# Patient Record
Sex: FEMALE | Race: WHITE | NOT HISPANIC OR LATINO | URBAN - METROPOLITAN AREA
[De-identification: names, ages, dates, MRNs, and addresses within clinical notes are randomized per-mention and may not be internally consistent; named-entity substitution may affect disease eponyms.]

---

## 2018-05-24 DIAGNOSIS — M54.9 DORSALGIA, UNSPECIFIED: ICD-10-CM

## 2018-05-24 PROBLEM — Z00.00 ENCOUNTER FOR PREVENTIVE HEALTH EXAMINATION: Status: ACTIVE | Noted: 2018-05-24

## 2018-07-19 ENCOUNTER — EMERGENCY (EMERGENCY)
Facility: HOSPITAL | Age: 48
LOS: 1 days | Discharge: HOME | End: 2018-07-19
Attending: EMERGENCY MEDICINE | Admitting: EMERGENCY MEDICINE
Payer: COMMERCIAL

## 2018-07-19 VITALS
OXYGEN SATURATION: 100 % | RESPIRATION RATE: 16 BRPM | DIASTOLIC BLOOD PRESSURE: 87 MMHG | TEMPERATURE: 98 F | HEART RATE: 61 BPM | SYSTOLIC BLOOD PRESSURE: 134 MMHG

## 2018-07-19 VITALS
DIASTOLIC BLOOD PRESSURE: 70 MMHG | TEMPERATURE: 99 F | RESPIRATION RATE: 16 BRPM | HEART RATE: 62 BPM | SYSTOLIC BLOOD PRESSURE: 116 MMHG | OXYGEN SATURATION: 100 %

## 2018-07-19 LAB
ALBUMIN SERPL ELPH-MCNC: 4 G/DL — SIGNIFICANT CHANGE UP (ref 3.3–5)
ALP SERPL-CCNC: 76 U/L — SIGNIFICANT CHANGE UP (ref 40–120)
ALT FLD-CCNC: 19 U/L — SIGNIFICANT CHANGE UP (ref 4–33)
AST SERPL-CCNC: 17 U/L — SIGNIFICANT CHANGE UP (ref 4–32)
BASOPHILS # BLD AUTO: 0.07 K/UL — SIGNIFICANT CHANGE UP (ref 0–0.2)
BASOPHILS NFR BLD AUTO: 1 % — SIGNIFICANT CHANGE UP (ref 0–2)
BILIRUB SERPL-MCNC: 0.2 MG/DL — SIGNIFICANT CHANGE UP (ref 0.2–1.2)
BUN SERPL-MCNC: 13 MG/DL — SIGNIFICANT CHANGE UP (ref 7–23)
CALCIUM SERPL-MCNC: 8.8 MG/DL — SIGNIFICANT CHANGE UP (ref 8.4–10.5)
CHLORIDE SERPL-SCNC: 102 MMOL/L — SIGNIFICANT CHANGE UP (ref 98–107)
CO2 SERPL-SCNC: 28 MMOL/L — SIGNIFICANT CHANGE UP (ref 22–31)
CREAT SERPL-MCNC: 0.78 MG/DL — SIGNIFICANT CHANGE UP (ref 0.5–1.3)
EOSINOPHIL # BLD AUTO: 0.25 K/UL — SIGNIFICANT CHANGE UP (ref 0–0.5)
EOSINOPHIL NFR BLD AUTO: 3.5 % — SIGNIFICANT CHANGE UP (ref 0–6)
GLUCOSE SERPL-MCNC: 94 MG/DL — SIGNIFICANT CHANGE UP (ref 70–99)
HCT VFR BLD CALC: 39.8 % — SIGNIFICANT CHANGE UP (ref 34.5–45)
HGB BLD-MCNC: 13.3 G/DL — SIGNIFICANT CHANGE UP (ref 11.5–15.5)
IMM GRANULOCYTES # BLD AUTO: 0.01 # — SIGNIFICANT CHANGE UP
IMM GRANULOCYTES NFR BLD AUTO: 0.1 % — SIGNIFICANT CHANGE UP (ref 0–1.5)
LYMPHOCYTES # BLD AUTO: 2.68 K/UL — SIGNIFICANT CHANGE UP (ref 1–3.3)
LYMPHOCYTES # BLD AUTO: 37.2 % — SIGNIFICANT CHANGE UP (ref 13–44)
MCHC RBC-ENTMCNC: 30.6 PG — SIGNIFICANT CHANGE UP (ref 27–34)
MCHC RBC-ENTMCNC: 33.4 % — SIGNIFICANT CHANGE UP (ref 32–36)
MCV RBC AUTO: 91.7 FL — SIGNIFICANT CHANGE UP (ref 80–100)
MONOCYTES # BLD AUTO: 0.32 K/UL — SIGNIFICANT CHANGE UP (ref 0–0.9)
MONOCYTES NFR BLD AUTO: 4.4 % — SIGNIFICANT CHANGE UP (ref 2–14)
NEUTROPHILS # BLD AUTO: 3.87 K/UL — SIGNIFICANT CHANGE UP (ref 1.8–7.4)
NEUTROPHILS NFR BLD AUTO: 53.8 % — SIGNIFICANT CHANGE UP (ref 43–77)
NRBC # FLD: 0 — SIGNIFICANT CHANGE UP
PLATELET # BLD AUTO: 289 K/UL — SIGNIFICANT CHANGE UP (ref 150–400)
PMV BLD: 10.2 FL — SIGNIFICANT CHANGE UP (ref 7–13)
POTASSIUM SERPL-MCNC: 3.8 MMOL/L — SIGNIFICANT CHANGE UP (ref 3.5–5.3)
POTASSIUM SERPL-SCNC: 3.8 MMOL/L — SIGNIFICANT CHANGE UP (ref 3.5–5.3)
PROT SERPL-MCNC: 6.6 G/DL — SIGNIFICANT CHANGE UP (ref 6–8.3)
RBC # BLD: 4.34 M/UL — SIGNIFICANT CHANGE UP (ref 3.8–5.2)
RBC # FLD: 11.8 % — SIGNIFICANT CHANGE UP (ref 10.3–14.5)
SODIUM SERPL-SCNC: 140 MMOL/L — SIGNIFICANT CHANGE UP (ref 135–145)
WBC # BLD: 7.2 K/UL — SIGNIFICANT CHANGE UP (ref 3.8–10.5)
WBC # FLD AUTO: 7.2 K/UL — SIGNIFICANT CHANGE UP (ref 3.8–10.5)

## 2018-07-19 PROCEDURE — 99285 EMERGENCY DEPT VISIT HI MDM: CPT

## 2018-07-19 PROCEDURE — 76830 TRANSVAGINAL US NON-OB: CPT | Mod: 26

## 2018-07-19 NOTE — ED PROVIDER NOTE - OBJECTIVE STATEMENT
48yo F no sig pmhx pw vaginal bleeding. States started to bleed Saturday,  5 days ago, Intermittent heavy since. This morning went to work had a "gush of blood w clots"  lower abdominal cramp. LMP was three months ago, "I am going through the change (menopause)" not dizzy or lightheaded. no chest pain or sob. no nausea/vomit./fever or chills. not on AC. hx of psoriasis  hx of tubal ligation

## 2018-07-19 NOTE — CONSULT NOTE ADULT - ASSESSMENT
47y , LMP Apr/May 2018, presents with heavy vaginal bleeding. H/H stable. VSS. No active bleeding. Thickened endometrium likely 2/2 amenorrhea x 2 months, experiencing withdrawal bleeding    -No acute GYN interventions at this time  -F/u with Dr. Sal to establish GYN care  -Pt to be dicharged home with bleeding and anemia precautions    D/w Dr. Doron Villatoro PGY2 47y , LMP Apr/May 2018, presents with heavy vaginal bleeding. H/H stable. VSS. No active bleeding. Thickened endometrium likely 2/2 amenorrhea x 2 months, experiencing withdrawal bleeding    #Abnormal uterine bleeding  -No acute GYN interventions at this time  -F/u with Dr. Sal to establish GYN care  -Pt to be dicharged home with bleeding and anemia precautions    D/w Dr. Doron Villatoro PGY2

## 2018-07-19 NOTE — CONSULT NOTE ADULT - SUBJECTIVE AND OBJECTIVE BOX
R2 GYNECOLOGIC CONSULT NOTE    47y , LMP Apr/May 2018, presents with heavy vaginal bleeding. Pt states that she has not had a period for a couple months. She typically gets her periods monthly, that are moderate and last for 3-4 days. She states she first started having vaginal spotting this past month that resolved spontaneously. On  she started her "period" that was much heavier than normal. She felt the blood "pour" out with passage of some clots. Bleeding became moderate to light after a few days. This AM she felt another gush of blood and saturated 2 pads in 4 hours. Denies lightheadedness, dizziness, SOB, CP, fever, chills.    OB/GYN HISTORY:  x 2 uncomplicated (, )    Surgical History:  tubal ligation     Past Medical History: denies    Medications: OTC pain medications     Allergies: No Known Allergies    Social History: occasional etoh, denies tobacco, illicit drug use     REVIEW OF SYSTEMS: WNL except as stated in HPI    OBJECTIVE FINDINGS:    Vital Signs Last 24 Hrs  T(C): 36.7 (2018 10:54), Max: 36.7 (2018 10:54)  T(F): 98 (2018 10:54), Max: 98 (2018 10:54)  HR: 61 (2018 10:54) (61 - 61)  BP: 134/87 (2018 10:54) (134/87 - 134/87)  BP(mean): --  RR: 16 (2018 10:54) (16 - 16)  SpO2: 100% (2018 10:54) (100% - 100%)    PHYSICAL EXAM:    GENERAL: NAD, well-developed  ABDOMEN: Soft, Nontender, Nondistended; No rebound, No guarding  PELVIC: SSE revealed 10 cc of dark red blood and no clots. Os closed, uterus anteverted approx 10 weeks in size. Adnexa nonpalpable and nontender b/l.      LABS:                        13.3   7.20  )-----------( 289      ( 2018 12:07 )             39.8     -    140  |  102  |  13  ----------------------------<  94  3.8   |  28  |  0.78    Ca    8.8      2018 12:07    TPro  6.6  /  Alb  4.0  /  TBili  0.2  /  DBili  x   /  AST  17  /  ALT  19  /  AlkPhos  76  -          RADIOLOGY & ADDITIONAL STUDIES:  < from: US Transvaginal (18 @ 13:09) >  PROCEDURE DATE:  2018         INTERPRETATION:  CLINICAL INFORMATION: Patient with irregular menstrual   cycles. New onset of heavy vaginal bleeding with clots.    LMP: Unknown.    COMPARISON: None available.    TECHNIQUE:     Transabdominal and Endovaginal pelvic sonogram.        FINDINGS:    Uterus: 9.4 x 5.2 x 4.7 cm. An intramural fibroid of 1.5 cm in the fundus.    Endometrium: 13 mm. Within normal limits.    Right ovary: 3.6 x 3.8 x 2.9 cm. Within normal limits. Contains a   physiological cyst.    Left ovary: Not visualized.    Fluid: None.    IMPRESSION:    Small intramural fibroid. Uterus is otherwise normal in appearance.  Left ovary is not visualized.    < end of copied text >

## 2018-07-19 NOTE — ED PROVIDER NOTE - PROGRESS NOTE DETAILS
Jose E Sal, knows pt request labs and us, to be called after results. GYN evaluated pt, may dc home w out patient GYN followup. dc w copies of results.

## 2018-07-19 NOTE — ED ADULT TRIAGE NOTE - CHIEF COMPLAINT QUOTE
pt c/o heavy vaginal bleeding on Saturday evening and then started again this morning. pt also c/o lower abd and lower back cramping pmhx tubal ligation (2005)

## 2018-07-19 NOTE — ED PROVIDER NOTE - PHYSICAL EXAMINATION
pt dressed sitting up no distress. nontoxic, not clinically orthostatic.  normal S1-S2 No resp distress. able to speak in full and clear sentences. no wheeze, rales or stridor. soft nondistended mild lower mid tenderness. pt AOx3 refused pelvic exam

## 2018-07-19 NOTE — ED ADULT NURSE NOTE - OBJECTIVE STATEMENT
pt states last normal period 3 months ago, last night she began to have very heavy vaginal bleeding, with painful cramping. 20g placed to right AC labs drawn and sent .

## 2018-09-11 ENCOUNTER — APPOINTMENT (OUTPATIENT)
Dept: PEDIATRICS | Facility: CLINIC | Age: 48
End: 2018-09-11
Payer: COMMERCIAL

## 2018-09-11 DIAGNOSIS — Z11.1 ENCOUNTER FOR SCREENING FOR RESPIRATORY TUBERCULOSIS: ICD-10-CM

## 2018-09-11 PROCEDURE — 86580 TB INTRADERMAL TEST: CPT

## 2018-09-12 ENCOUNTER — RESULT REVIEW (OUTPATIENT)
Age: 48
End: 2018-09-12

## 2018-09-22 ENCOUNTER — RX RENEWAL (OUTPATIENT)
Age: 48
End: 2018-09-22

## 2018-11-08 ENCOUNTER — APPOINTMENT (OUTPATIENT)
Dept: PEDIATRICS | Facility: CLINIC | Age: 48
End: 2018-11-08
Payer: COMMERCIAL

## 2018-11-08 DIAGNOSIS — Z23 ENCOUNTER FOR IMMUNIZATION: ICD-10-CM

## 2018-11-08 PROCEDURE — 90471 IMMUNIZATION ADMIN: CPT

## 2018-11-08 PROCEDURE — 90686 IIV4 VACC NO PRSV 0.5 ML IM: CPT

## 2018-11-08 NOTE — HISTORY OF PRESENT ILLNESS
[de-identified] : flu vaccine [FreeTextEntry6] : JOSH  here for vaccine update, no complaints\par

## 2018-11-23 ENCOUNTER — RX RENEWAL (OUTPATIENT)
Age: 48
End: 2018-11-23

## 2019-01-14 NOTE — ED PROVIDER NOTE - TOBACCO USE
Ventricular Rate : 63   Atrial Rate : 63   P-R Interval : 204   QRS Duration : 95   Q-T Interval : 415   QTC Calculation(Bezet) : 425   P Axis : 41   R Axis : 2   T Axis : 33   Diagnosis : Sinus rhythm~Borderline prolonged NH interval~Probable left atrial enlargement~~Confirmed by Alok Schwarz ([id number]) on 12/1/2018 8:07:39 PM     
Unknown if ever smoked

## 2019-02-04 ENCOUNTER — TRANSCRIPTION ENCOUNTER (OUTPATIENT)
Age: 49
End: 2019-02-04

## 2019-02-04 DIAGNOSIS — J06.9 ACUTE UPPER RESPIRATORY INFECTION, UNSPECIFIED: ICD-10-CM

## 2019-04-08 NOTE — ED ADULT NURSE NOTE - CHIEF COMPLAINT QUOTE
He is in a panic mode - make an appt this week.  He is having new bizarre sx.   pt c/o heavy vaginal bleeding on Saturday evening and then started again this morning. pt also c/o lower abd and lower back cramping pmhx tubal ligation (2005)

## 2019-08-15 DIAGNOSIS — K21.9 GASTRO-ESOPHAGEAL REFLUX DISEASE W/OUT ESOPHAGITIS: ICD-10-CM

## 2019-11-04 ENCOUNTER — LABORATORY RESULT (OUTPATIENT)
Age: 49
End: 2019-11-04

## 2019-11-04 ENCOUNTER — APPOINTMENT (OUTPATIENT)
Dept: DERMATOLOGY | Facility: CLINIC | Age: 49
End: 2019-11-04
Payer: COMMERCIAL

## 2019-11-04 VITALS — WEIGHT: 210 LBS | BODY MASS INDEX: 34.99 KG/M2 | HEIGHT: 65 IN

## 2019-11-04 DIAGNOSIS — Q82.8 OTHER SPECIFIED CONGENITAL MALFORMATIONS OF SKIN: ICD-10-CM

## 2019-11-04 DIAGNOSIS — R22.9 LOCALIZED SWELLING, MASS AND LUMP, UNSPECIFIED: ICD-10-CM

## 2019-11-04 PROCEDURE — 11104 PUNCH BX SKIN SINGLE LESION: CPT

## 2019-11-04 PROCEDURE — 99203 OFFICE O/P NEW LOW 30 MIN: CPT | Mod: 25

## 2019-11-09 ENCOUNTER — TRANSCRIPTION ENCOUNTER (OUTPATIENT)
Age: 49
End: 2019-11-09

## 2019-11-18 ENCOUNTER — APPOINTMENT (OUTPATIENT)
Dept: DERMATOLOGY | Facility: CLINIC | Age: 49
End: 2019-11-18

## 2019-12-01 ENCOUNTER — LABORATORY RESULT (OUTPATIENT)
Age: 49
End: 2019-12-01

## 2019-12-02 ENCOUNTER — APPOINTMENT (OUTPATIENT)
Dept: DERMATOLOGY | Facility: CLINIC | Age: 49
End: 2019-12-02
Payer: COMMERCIAL

## 2019-12-02 DIAGNOSIS — D48.5 NEOPLASM OF UNCERTAIN BEHAVIOR OF SKIN: ICD-10-CM

## 2019-12-02 DIAGNOSIS — D86.9 SARCOIDOSIS, UNSPECIFIED: ICD-10-CM

## 2019-12-02 PROCEDURE — 11104 PUNCH BX SKIN SINGLE LESION: CPT

## 2019-12-02 PROCEDURE — 99213 OFFICE O/P EST LOW 20 MIN: CPT | Mod: 25

## 2020-02-03 ENCOUNTER — APPOINTMENT (OUTPATIENT)
Dept: MAMMOGRAPHY | Facility: IMAGING CENTER | Age: 50
End: 2020-02-03
Payer: COMMERCIAL

## 2020-02-03 ENCOUNTER — OUTPATIENT (OUTPATIENT)
Dept: OUTPATIENT SERVICES | Facility: HOSPITAL | Age: 50
LOS: 1 days | End: 2020-02-03
Payer: COMMERCIAL

## 2020-02-03 DIAGNOSIS — Z00.00 ENCOUNTER FOR GENERAL ADULT MEDICAL EXAMINATION WITHOUT ABNORMAL FINDINGS: ICD-10-CM

## 2020-02-03 PROCEDURE — 77063 BREAST TOMOSYNTHESIS BI: CPT

## 2020-02-03 PROCEDURE — 77063 BREAST TOMOSYNTHESIS BI: CPT | Mod: 26

## 2020-02-03 PROCEDURE — 77067 SCR MAMMO BI INCL CAD: CPT

## 2020-02-03 PROCEDURE — 77067 SCR MAMMO BI INCL CAD: CPT | Mod: 26

## 2020-02-25 ENCOUNTER — TRANSCRIPTION ENCOUNTER (OUTPATIENT)
Age: 50
End: 2020-02-25

## 2020-03-09 ENCOUNTER — APPOINTMENT (OUTPATIENT)
Dept: ULTRASOUND IMAGING | Facility: IMAGING CENTER | Age: 50
End: 2020-03-09

## 2020-04-26 ENCOUNTER — MESSAGE (OUTPATIENT)
Age: 50
End: 2020-04-26

## 2020-05-15 DIAGNOSIS — L40.9 PSORIASIS, UNSPECIFIED: ICD-10-CM

## 2020-05-19 ENCOUNTER — APPOINTMENT (OUTPATIENT)
Dept: DISASTER EMERGENCY | Facility: CLINIC | Age: 50
End: 2020-05-19

## 2020-05-20 LAB
SARS-COV-2 IGG SERPL IA-ACNC: <0.1 INDEX
SARS-COV-2 IGG SERPL QL IA: NEGATIVE

## 2020-12-16 PROBLEM — Z11.1 ENCOUNTER FOR PPD TEST: Status: RESOLVED | Noted: 2018-09-11 | Resolved: 2020-12-16

## 2020-12-21 PROBLEM — J06.9 VIRAL UPPER RESPIRATORY TRACT INFECTION: Status: RESOLVED | Noted: 2019-02-06 | Resolved: 2020-12-21

## 2021-06-17 ENCOUNTER — RESULT REVIEW (OUTPATIENT)
Age: 51
End: 2021-06-17

## 2021-09-21 ENCOUNTER — TRANSCRIPTION ENCOUNTER (OUTPATIENT)
Age: 51
End: 2021-09-21

## 2021-09-23 ENCOUNTER — RESULT REVIEW (OUTPATIENT)
Age: 51
End: 2021-09-23

## 2021-09-23 ENCOUNTER — OUTPATIENT (OUTPATIENT)
Dept: OUTPATIENT SERVICES | Facility: HOSPITAL | Age: 51
LOS: 1 days | End: 2021-09-23
Payer: COMMERCIAL

## 2021-09-23 VITALS
RESPIRATION RATE: 18 BRPM | OXYGEN SATURATION: 98 % | DIASTOLIC BLOOD PRESSURE: 51 MMHG | HEIGHT: 64.5 IN | HEART RATE: 77 BPM | WEIGHT: 212.53 LBS | SYSTOLIC BLOOD PRESSURE: 120 MMHG | TEMPERATURE: 97 F

## 2021-09-23 DIAGNOSIS — C50.912 MALIGNANT NEOPLASM OF UNSPECIFIED SITE OF LEFT FEMALE BREAST: ICD-10-CM

## 2021-09-23 DIAGNOSIS — Z98.51 TUBAL LIGATION STATUS: Chronic | ICD-10-CM

## 2021-09-23 DIAGNOSIS — Z98.890 OTHER SPECIFIED POSTPROCEDURAL STATES: Chronic | ICD-10-CM

## 2021-09-23 DIAGNOSIS — Z01.818 ENCOUNTER FOR OTHER PREPROCEDURAL EXAMINATION: ICD-10-CM

## 2021-09-23 DIAGNOSIS — C50.812 MALIGNANT NEOPLASM OF OVERLAPPING SITES OF LEFT FEMALE BREAST: ICD-10-CM

## 2021-09-23 LAB
ANION GAP SERPL CALC-SCNC: 10 MMOL/L — SIGNIFICANT CHANGE UP (ref 5–17)
BUN SERPL-MCNC: 12 MG/DL — SIGNIFICANT CHANGE UP (ref 7–23)
CALCIUM SERPL-MCNC: 9.2 MG/DL — SIGNIFICANT CHANGE UP (ref 8.4–10.5)
CHLORIDE SERPL-SCNC: 104 MMOL/L — SIGNIFICANT CHANGE UP (ref 96–108)
CO2 SERPL-SCNC: 28 MMOL/L — SIGNIFICANT CHANGE UP (ref 22–31)
CREAT SERPL-MCNC: 0.74 MG/DL — SIGNIFICANT CHANGE UP (ref 0.5–1.3)
GLUCOSE SERPL-MCNC: 101 MG/DL — HIGH (ref 70–99)
HCT VFR BLD CALC: 41.7 % — SIGNIFICANT CHANGE UP (ref 34.5–45)
HGB BLD-MCNC: 14.1 G/DL — SIGNIFICANT CHANGE UP (ref 11.5–15.5)
MCHC RBC-ENTMCNC: 32.1 PG — SIGNIFICANT CHANGE UP (ref 27–34)
MCHC RBC-ENTMCNC: 33.8 GM/DL — SIGNIFICANT CHANGE UP (ref 32–36)
MCV RBC AUTO: 95 FL — SIGNIFICANT CHANGE UP (ref 80–100)
NRBC # BLD: 0 /100 WBCS — SIGNIFICANT CHANGE UP (ref 0–0)
PLATELET # BLD AUTO: 283 K/UL — SIGNIFICANT CHANGE UP (ref 150–400)
POTASSIUM SERPL-MCNC: 4.1 MMOL/L — SIGNIFICANT CHANGE UP (ref 3.5–5.3)
POTASSIUM SERPL-SCNC: 4.1 MMOL/L — SIGNIFICANT CHANGE UP (ref 3.5–5.3)
RBC # BLD: 4.39 M/UL — SIGNIFICANT CHANGE UP (ref 3.8–5.2)
RBC # FLD: 12.2 % — SIGNIFICANT CHANGE UP (ref 10.3–14.5)
SARS-COV-2 RNA SPEC QL NAA+PROBE: SIGNIFICANT CHANGE UP
SODIUM SERPL-SCNC: 142 MMOL/L — SIGNIFICANT CHANGE UP (ref 135–145)
WBC # BLD: 6.84 K/UL — SIGNIFICANT CHANGE UP (ref 3.8–10.5)
WBC # FLD AUTO: 6.84 K/UL — SIGNIFICANT CHANGE UP (ref 3.8–10.5)

## 2021-09-23 PROCEDURE — G0463: CPT

## 2021-09-23 PROCEDURE — 85027 COMPLETE CBC AUTOMATED: CPT

## 2021-09-23 PROCEDURE — 88321 CONSLTJ&REPRT SLD PREP ELSWR: CPT

## 2021-09-23 PROCEDURE — U0003: CPT

## 2021-09-23 PROCEDURE — 36415 COLL VENOUS BLD VENIPUNCTURE: CPT

## 2021-09-23 PROCEDURE — U0005: CPT

## 2021-09-23 PROCEDURE — 80048 BASIC METABOLIC PNL TOTAL CA: CPT

## 2021-09-23 NOTE — H&P PST ADULT - HISTORY OF PRESENT ILLNESS
49 y/o female presents for PST. As per patient she had routine mammo and ultrasound done in August 2021 and abnormality was noted to left breast. Patient was referred for biopsy which resulted stage 1 ductal carcinoma per patient. Patient hx of covid or recent travelling. Patient stated that she is fully vaccinated.

## 2021-09-23 NOTE — H&P PST ADULT - PROBLEM SELECTOR PLAN 1
Patient schedule for left breast lumpectomy, lymph biopsy and b/l breast reduction. Labs and covid testing pending.

## 2021-09-23 NOTE — H&P PST ADULT - NSICDXPASTMEDICALHX_GEN_ALL_CORE_FT
PAST MEDICAL HISTORY:  Anxiety     Ductal carcinoma of left breast     H/O vertigo     Joint pain     Plaque psoriasis

## 2021-09-23 NOTE — H&P PST ADULT - ATTENDING COMMENTS
The patient is a 50 year old female who was recently diagnosed with left 3:00 invasive ductal breast cancer. She presents to undergo a left tamiko  localization lumpectomy, left axillary sentinel lymph node biopsy, possible left axillary lymph node dissection and bilateral oncoplastic reduction.

## 2021-09-23 NOTE — H&P PST ADULT - NSICDXFAMILYHX_GEN_ALL_CORE_FT
FAMILY HISTORY:  Mother  Still living? No  FH: heart attack, Age at diagnosis: Age Unknown  FH: heart disease, Age at diagnosis: Age Unknown  FH: HTN (hypertension), Age at diagnosis: Age Unknown  FH: hypercholesterolemia, Age at diagnosis: Age Unknown  FH: type 2 diabetes, Age at diagnosis: Age Unknown    Sibling  Still living? Unknown  FH: asthma, Age at diagnosis: Age Unknown

## 2021-09-25 ENCOUNTER — APPOINTMENT (OUTPATIENT)
Dept: ULTRASOUND IMAGING | Facility: CLINIC | Age: 51
End: 2021-09-25
Payer: COMMERCIAL

## 2021-09-25 ENCOUNTER — OUTPATIENT (OUTPATIENT)
Dept: OUTPATIENT SERVICES | Facility: HOSPITAL | Age: 51
LOS: 1 days | End: 2021-09-25
Payer: COMMERCIAL

## 2021-09-25 DIAGNOSIS — Z00.8 ENCOUNTER FOR OTHER GENERAL EXAMINATION: ICD-10-CM

## 2021-09-25 DIAGNOSIS — Z98.51 TUBAL LIGATION STATUS: Chronic | ICD-10-CM

## 2021-09-25 DIAGNOSIS — Z98.890 OTHER SPECIFIED POSTPROCEDURAL STATES: Chronic | ICD-10-CM

## 2021-09-25 PROBLEM — L40.0 PSORIASIS VULGARIS: Chronic | Status: ACTIVE | Noted: 2021-09-23

## 2021-09-25 PROBLEM — F41.9 ANXIETY DISORDER, UNSPECIFIED: Chronic | Status: ACTIVE | Noted: 2021-09-23

## 2021-09-25 PROBLEM — Z87.898 PERSONAL HISTORY OF OTHER SPECIFIED CONDITIONS: Chronic | Status: ACTIVE | Noted: 2021-09-23

## 2021-09-25 PROCEDURE — C1739: CPT

## 2021-09-25 PROCEDURE — 19285 PERQ DEV BREAST 1ST US IMAG: CPT | Mod: LT

## 2021-09-25 PROCEDURE — 19285 PERQ DEV BREAST 1ST US IMAG: CPT

## 2021-09-26 ENCOUNTER — TRANSCRIPTION ENCOUNTER (OUTPATIENT)
Age: 51
End: 2021-09-26

## 2021-09-27 ENCOUNTER — RESULT REVIEW (OUTPATIENT)
Age: 51
End: 2021-09-27

## 2021-09-27 ENCOUNTER — OUTPATIENT (OUTPATIENT)
Dept: OUTPATIENT SERVICES | Facility: HOSPITAL | Age: 51
LOS: 1 days | End: 2021-09-27
Payer: COMMERCIAL

## 2021-09-27 VITALS
RESPIRATION RATE: 16 BRPM | HEART RATE: 89 BPM | TEMPERATURE: 98 F | SYSTOLIC BLOOD PRESSURE: 117 MMHG | OXYGEN SATURATION: 95 % | DIASTOLIC BLOOD PRESSURE: 76 MMHG

## 2021-09-27 VITALS
HEIGHT: 64.5 IN | WEIGHT: 212.53 LBS | DIASTOLIC BLOOD PRESSURE: 77 MMHG | RESPIRATION RATE: 16 BRPM | OXYGEN SATURATION: 95 % | SYSTOLIC BLOOD PRESSURE: 115 MMHG | TEMPERATURE: 98 F | HEART RATE: 78 BPM

## 2021-09-27 DIAGNOSIS — Z98.890 OTHER SPECIFIED POSTPROCEDURAL STATES: Chronic | ICD-10-CM

## 2021-09-27 DIAGNOSIS — C50.812 MALIGNANT NEOPLASM OF OVERLAPPING SITES OF LEFT FEMALE BREAST: ICD-10-CM

## 2021-09-27 DIAGNOSIS — Z98.51 TUBAL LIGATION STATUS: Chronic | ICD-10-CM

## 2021-09-27 PROCEDURE — 76098 X-RAY EXAM SURGICAL SPECIMEN: CPT | Mod: 26

## 2021-09-27 PROCEDURE — 88307 TISSUE EXAM BY PATHOLOGIST: CPT | Mod: 26

## 2021-09-27 PROCEDURE — 38525 BIOPSY/REMOVAL LYMPH NODES: CPT

## 2021-09-27 PROCEDURE — 88305 TISSUE EXAM BY PATHOLOGIST: CPT | Mod: 26

## 2021-09-27 PROCEDURE — 88307 TISSUE EXAM BY PATHOLOGIST: CPT

## 2021-09-27 PROCEDURE — 88305 TISSUE EXAM BY PATHOLOGIST: CPT

## 2021-09-27 PROCEDURE — 38792 RA TRACER ID OF SENTINL NODE: CPT

## 2021-09-27 PROCEDURE — 19301 PARTIAL MASTECTOMY: CPT | Mod: LT

## 2021-09-27 PROCEDURE — A9541: CPT

## 2021-09-27 PROCEDURE — 19318 BREAST REDUCTION: CPT | Mod: XP

## 2021-09-27 PROCEDURE — 76098 X-RAY EXAM SURGICAL SPECIMEN: CPT

## 2021-09-27 RX ORDER — OXYCODONE AND ACETAMINOPHEN 5; 325 MG/1; MG/1
1 TABLET ORAL EVERY 4 HOURS
Refills: 0 | Status: DISCONTINUED | OUTPATIENT
Start: 2021-09-27 | End: 2021-09-27

## 2021-09-27 RX ORDER — HYDROMORPHONE HYDROCHLORIDE 2 MG/ML
0.5 INJECTION INTRAMUSCULAR; INTRAVENOUS; SUBCUTANEOUS
Refills: 0 | Status: COMPLETED | OUTPATIENT
Start: 2021-09-27 | End: 2021-09-27

## 2021-09-27 RX ORDER — SODIUM CHLORIDE 9 MG/ML
1000 INJECTION, SOLUTION INTRAVENOUS
Refills: 0 | Status: DISCONTINUED | OUTPATIENT
Start: 2021-09-27 | End: 2021-09-27

## 2021-09-27 RX ORDER — HYDROMORPHONE HYDROCHLORIDE 2 MG/ML
1 INJECTION INTRAMUSCULAR; INTRAVENOUS; SUBCUTANEOUS ONCE
Refills: 0 | Status: DISCONTINUED | OUTPATIENT
Start: 2021-09-27 | End: 2021-09-27

## 2021-09-27 RX ORDER — ONDANSETRON 8 MG/1
4 TABLET, FILM COATED ORAL ONCE
Refills: 0 | Status: DISCONTINUED | OUTPATIENT
Start: 2021-09-27 | End: 2021-09-27

## 2021-09-27 RX ORDER — HYDROMORPHONE HYDROCHLORIDE 2 MG/ML
0.5 INJECTION INTRAMUSCULAR; INTRAVENOUS; SUBCUTANEOUS ONCE
Refills: 0 | Status: DISCONTINUED | OUTPATIENT
Start: 2021-09-27 | End: 2021-09-27

## 2021-09-27 RX ADMIN — SODIUM CHLORIDE 50 MILLILITER(S): 9 INJECTION, SOLUTION INTRAVENOUS at 06:33

## 2021-09-27 RX ADMIN — HYDROMORPHONE HYDROCHLORIDE 0.5 MILLIGRAM(S): 2 INJECTION INTRAMUSCULAR; INTRAVENOUS; SUBCUTANEOUS at 12:20

## 2021-09-27 RX ADMIN — HYDROMORPHONE HYDROCHLORIDE 0.5 MILLIGRAM(S): 2 INJECTION INTRAMUSCULAR; INTRAVENOUS; SUBCUTANEOUS at 12:40

## 2021-09-27 RX ADMIN — HYDROMORPHONE HYDROCHLORIDE 0.5 MILLIGRAM(S): 2 INJECTION INTRAMUSCULAR; INTRAVENOUS; SUBCUTANEOUS at 12:05

## 2021-09-27 RX ADMIN — OXYCODONE AND ACETAMINOPHEN 1 TABLET(S): 5; 325 TABLET ORAL at 13:39

## 2021-09-27 RX ADMIN — OXYCODONE AND ACETAMINOPHEN 1 TABLET(S): 5; 325 TABLET ORAL at 14:10

## 2021-09-27 NOTE — BRIEF OPERATIVE NOTE - NSICDXBRIEFPROCEDURE_GEN_ALL_CORE_FT
PROCEDURES:  Oncoplastic breast reduction 27-Sep-2021 11:55:24 Bilateral Damion Alcala  
PROCEDURES:  Excision of accessory axillary breast tissue 27-Sep-2021 09:41:34 left Palleschi, Susan M  Lumpectomy of breast with sentinel node biopsy 27-Sep-2021 09:41:52 left 3:00 with tamiko  localization Palleschi, Susan M

## 2021-09-27 NOTE — ASU DISCHARGE PLAN (ADULT/PEDIATRIC) - ASU DC SPECIAL INSTRUCTIONSFT
This patient may be discharged home from ASU when criteria is met   Please provide BENITEZ drain instructions , and Queenie instructions  have patient record output  take all prescriptions as directed by Dr Zavala   Do not shower with wound vac in place , keep all dry, may sponge bathe  no heavy lifting, pushing, or pulling   advance diet slowly   Drink plenty of water

## 2021-09-27 NOTE — BRIEF OPERATIVE NOTE - NSICDXBRIEFPOSTOP_GEN_ALL_CORE_FT
POST-OP DIAGNOSIS:  Cancer of overlapping sites of left breast 27-Sep-2021 09:42:37  Palleschi, Susan M  Axillary accessory breast tissue 27-Sep-2021 09:43:15 left Palleschi, Susan M  
POST-OP DIAGNOSIS:  Cancer of overlapping sites of left breast 27-Sep-2021 09:42:37  Palleschi, Susan M  Axillary accessory breast tissue 27-Sep-2021 09:43:15 left Palleschi, Susan M

## 2021-09-27 NOTE — ASU DISCHARGE PLAN (ADULT/PEDIATRIC) - CARE PROVIDER_API CALL
Zeke Zavala)  Plastic Surgery; Surgery  [address], [address]  [place] [address]  Phone: [phone]  Fax: [phone]  Follow Up Time:     Palleschi, Susan M (MD)  Surgery  [address]  [place] [address]  Phone: [phone]  Fax: [phone]  Follow Up Time:

## 2021-09-27 NOTE — PRE-ANESTHESIA EVALUATION ADULT - NSANTHINDVINFOSD_GEN_ALL_CORE
Normal and symmetric appearance without webbing, redundant skin, masses, pits or sternocleidomastoid muscle lesions; clavicles of normal shape, contour and nontender on palpation.
patient

## 2021-09-27 NOTE — BRIEF OPERATIVE NOTE - NSICDXBRIEFPREOP_GEN_ALL_CORE_FT
PRE-OP DIAGNOSIS:  Cancer of overlapping sites of left breast 27-Sep-2021 09:42:24  Palleschi, Susan M  Axillary accessory breast tissue 27-Sep-2021 09:42:50 left Palleschi, Susan M  
PRE-OP DIAGNOSIS:  Cancer of overlapping sites of left breast 27-Sep-2021 09:42:24  Palleschi, Susan M  Axillary accessory breast tissue 27-Sep-2021 09:42:50 left Palleschi, Susan M

## 2021-09-27 NOTE — BRIEF OPERATIVE NOTE - SPECIMENS
left axillary sentinel lymph node x 1, left 3:00 lumpectomy, left axillary accessory breast tissue
bilateral breast skin and tissue

## 2021-09-27 NOTE — ASU DISCHARGE PLAN (ADULT/PEDIATRIC) - NURSING INSTRUCTIONS
drink plenty fluids,  empty and record drains as per instructions,  follow up with Dr. Zavala in one week- call office for an appointment time and bring drain record,  call Dr. Palleschi  office for an appointment time in 2 weeks

## 2021-09-27 NOTE — PRE-ANESTHESIA EVALUATION ADULT - NSANTHADDINFOFT_GEN_ALL_CORE
Discussed GA with ETT in detail with patient and all questions sought and answered. Pt. agrees to all plans and wishes to proceed with surgical care.

## 2021-11-08 ENCOUNTER — TRANSCRIPTION ENCOUNTER (OUTPATIENT)
Age: 51
End: 2021-11-08

## 2021-11-08 ENCOUNTER — APPOINTMENT (OUTPATIENT)
Dept: CT IMAGING | Facility: CLINIC | Age: 51
End: 2021-11-08
Payer: COMMERCIAL

## 2021-11-08 ENCOUNTER — OUTPATIENT (OUTPATIENT)
Dept: OUTPATIENT SERVICES | Facility: HOSPITAL | Age: 51
LOS: 1 days | End: 2021-11-08
Payer: COMMERCIAL

## 2021-11-08 DIAGNOSIS — Z98.51 TUBAL LIGATION STATUS: Chronic | ICD-10-CM

## 2021-11-08 DIAGNOSIS — Z98.890 OTHER SPECIFIED POSTPROCEDURAL STATES: Chronic | ICD-10-CM

## 2021-11-08 DIAGNOSIS — Z00.8 ENCOUNTER FOR OTHER GENERAL EXAMINATION: ICD-10-CM

## 2021-11-08 PROCEDURE — 74174 CTA ABD&PLVS W/CONTRAST: CPT | Mod: 26

## 2021-11-08 PROCEDURE — 74174 CTA ABD&PLVS W/CONTRAST: CPT

## 2021-11-12 ENCOUNTER — RESULT REVIEW (OUTPATIENT)
Age: 51
End: 2021-11-12

## 2021-11-29 ENCOUNTER — RESULT REVIEW (OUTPATIENT)
Age: 51
End: 2021-11-29

## 2021-12-06 ENCOUNTER — OUTPATIENT (OUTPATIENT)
Dept: OUTPATIENT SERVICES | Facility: HOSPITAL | Age: 51
LOS: 1 days | End: 2021-12-06
Payer: COMMERCIAL

## 2021-12-06 VITALS
TEMPERATURE: 99 F | HEART RATE: 82 BPM | RESPIRATION RATE: 17 BRPM | HEIGHT: 64 IN | WEIGHT: 212.08 LBS | DIASTOLIC BLOOD PRESSURE: 79 MMHG | SYSTOLIC BLOOD PRESSURE: 110 MMHG | OXYGEN SATURATION: 95 %

## 2021-12-06 DIAGNOSIS — Z98.890 OTHER SPECIFIED POSTPROCEDURAL STATES: Chronic | ICD-10-CM

## 2021-12-06 DIAGNOSIS — Z98.51 TUBAL LIGATION STATUS: Chronic | ICD-10-CM

## 2021-12-06 DIAGNOSIS — C50.812 MALIGNANT NEOPLASM OF OVERLAPPING SITES OF LEFT FEMALE BREAST: ICD-10-CM

## 2021-12-06 DIAGNOSIS — Z01.818 ENCOUNTER FOR OTHER PREPROCEDURAL EXAMINATION: ICD-10-CM

## 2021-12-06 DIAGNOSIS — C50.912 MALIGNANT NEOPLASM OF UNSPECIFIED SITE OF LEFT FEMALE BREAST: ICD-10-CM

## 2021-12-06 PROCEDURE — 86901 BLOOD TYPING SEROLOGIC RH(D): CPT

## 2021-12-06 PROCEDURE — G0463: CPT

## 2021-12-06 PROCEDURE — 85027 COMPLETE CBC AUTOMATED: CPT

## 2021-12-06 PROCEDURE — 86850 RBC ANTIBODY SCREEN: CPT

## 2021-12-06 PROCEDURE — 86900 BLOOD TYPING SEROLOGIC ABO: CPT

## 2021-12-06 PROCEDURE — 80048 BASIC METABOLIC PNL TOTAL CA: CPT

## 2021-12-06 RX ORDER — PREGABALIN 225 MG/1
1 CAPSULE ORAL
Qty: 0 | Refills: 0 | DISCHARGE

## 2021-12-06 RX ORDER — CEFAZOLIN SODIUM 1 G
2000 VIAL (EA) INJECTION ONCE
Refills: 0 | Status: COMPLETED | OUTPATIENT
Start: 2021-12-20 | End: 2021-12-20

## 2021-12-06 NOTE — H&P PST ADULT - FALL HARM RISK - UNIVERSAL INTERVENTIONS
Bed in lowest position, wheels locked, appropriate side rails in place/Call bell, personal items and telephone in reach/Instruct patient to call for assistance before getting out of bed or chair/Non-slip footwear when patient is out of bed/[place] to call system/Physically safe environment - no spills, clutter or unnecessary equipment/Purposeful Proactive Rounding/Room/bathroom lighting operational, light cord in reach

## 2021-12-06 NOTE — H&P PST ADULT - HISTORY OF PRESENT ILLNESS
51 yr old female newly dx with bilateral breast cancer s/p lumpectomy and reduction. Now for bilateral mastectomies with ALESSANDRO flap.    **COVID  denies travel  denies s/s  denies known exposure  vaccine Pfizer x 2 last 4/28/21  swab  12/17 [facility]

## 2021-12-06 NOTE — H&P PST ADULT - ATTENDING COMMENTS
The patient is a 51 year old female who recently underwent a left lumpectomy, left axillary sentinel lymph node biopsy and bilateral oncoplastic reduction on 9/27/21. The pathology revealed left breast invasive carcinoma and right breast DCIS. She presents to undergo bilateral nipple areolar sparing total mastectomies, right axillary sentinel lymph node biopsy, possible right axillary lymph node dissection and ALESSANDRO flap reconstruction.

## 2021-12-06 NOTE — H&P PST ADULT - REASON FOR ADMISSION
left breast cancer  right breast cancer newly dx I had surgery for left breast cancer then a reduction during reduction  carcinogenic cells noted on rig I had surgery for left breast cancer then a reduction during reduction  carcinogenic cells noted on right

## 2021-12-06 NOTE — H&P PST ADULT - NSICDXPASTSURGICALHX_GEN_ALL_CORE_FT
PAST SURGICAL HISTORY:  H/O tubal ligation 2005    S/P breast lumpectomy left 9/27/21  lymph node biopsy negative    S/P colonoscopic polypectomy 2021 benign    Status post bilateral breast reduction 1996 9/27/2021     PAST SURGICAL HISTORY:  H/O tubal ligation 2005    S/P breast lumpectomy left 9/27/21  lymph node biopsy negative    S/P colonoscopic polypectomy 2021 benign    Status post bilateral breast reduction 1996 9/27/2021 s/p this surgery had infection requiring antibiotics

## 2021-12-06 NOTE — H&P PST ADULT - PROBLEM SELECTOR PLAN 1
bilateral nipple areola sparing total mastectomies  right axillary sentinel lymph node biopsy  bilateral ALESSANDRO flap  bilateral nerve graft  possible axillary lymph node dissection

## 2021-12-06 NOTE — H&P PST ADULT - NSICDXPASTMEDICALHX_GEN_ALL_CORE_FT
PAST MEDICAL HISTORY:  Anxiety     BRCA negative     Breast cancer, right stage 0 found during reduction    Chronic back pain     Ductal carcinoma of left breast invasive ductal stage 1  ER positive to start medication s/p surgery    GERD (gastroesophageal reflux disease)     H/O vertigo     Joint pain     Plaque psoriasis

## 2021-12-06 NOTE — H&P PST ADULT - ASSESSMENT
Malignant Neoplasm Breast   CAPRINI SCORE [CLOT]    AGE RELATED RISK FACTORS                                                       MOBILITY RELATED FACTORS  [x ] Age 41-60 years                                            (1 Point)                  [ ] Bed rest                                                        (1 Point)  [ ] Age: 61-74 years                                           (2 Points)                 [ ] Plaster cast                                                   (2 Points)  [ ] Age= 75 years                                              (3 Points)                 [ ] Bed bound for more than 72 hours                 (2 Points)    DISEASE RELATED RISK FACTORS                                               GENDER SPECIFIC FACTORS  [ ] Edema in the lower extremities                       (1 Point)                  [ ] Pregnancy                                                     (1 Point)  [ ] Varicose veins                                               (1 Point)                  [ ] Post-partum < 6 weeks                                   (1 Point)             [x ] BMI > 25 Kg/m2                                            (1 Point)                  [ ] Hormonal therapy  or oral contraception          (1 Point)                 [ ] Sepsis (in the previous month)                        (1 Point)                  [ ] History of pregnancy complications                 (1 point)  [ ] Pneumonia or serious lung disease                                               [ ] Unexplained or recurrent                     (1 Point)           (in the previous month)                               (1 Point)  [ ] Abnormal pulmonary function test                     (1 Point)                 SURGERY RELATED RISK FACTORS  [ ] Acute myocardial infarction                              (1 Point)                 [ ]  Section                                             (1 Point)  [ ] Congestive heart failure (in the previous month)  (1 Point)               [ ] Minor surgery                                                  (1 Point)   [ ] Inflammatory bowel disease                             (1 Point)                 [ ] Arthroscopic surgery                                        (2 Points)  [ ] Central venous access                                      (2 Points)                [x ] General surgery lasting more than 45 minutes   (2 Points)       [ ] Stroke (in the previous month)                          (5 Points)               [ ] Elective arthroplasty                                         (5 Points)                                                                                                                                               HEMATOLOGY RELATED FACTORS                                                 TRAUMA RELATED RISK FACTORS  [ ] Prior episodes of VTE                                     (3 Points)                [ ] Fracture of the hip, pelvis, or leg                       (5 Points)  [ ] Positive family history for VTE                         (3 Points)                 [ ] Acute spinal cord injury (in the previous month)  (5 Points)  [ ] Prothrombin 57379 A                                     (3 Points)                 [ ] Paralysis  (less than 1 month)                             (5 Points)  [ ] Factor V Leiden                                             (3 Points)                  [ ] Multiple Trauma within 1 month                        (5 Points)  [ ] Lupus anticoagulants                                     (3 Points)                                                           [ ] Anticardiolipin antibodies                               (3 Points)                                                       [ ] High homocysteine in the blood                      (3 Points)                                             [ ] Other congenital or acquired thrombophilia      (3 Points)                                                [ ] Heparin induced thrombocytopenia                  (3 Points)                                          Total Score [    4     ]    Caprini Score 0 - 2:  Low Risk, No VTE Prophylaxis required for most patients, encourage ambulation  Caprini Score 3 - 6:  At Risk, pharmacologic VTE prophylaxis is indicated for most patients (in the absence of a contraindication)  Caprini Score Greater than or = 7:  High Risk, pharmacologic VTE prophylaxis is indicated for most patients (in the absence of a contraindication)

## 2021-12-09 PROBLEM — Z13.71 ENCOUNTER FOR NONPROCREATIVE SCREENING FOR GENETIC DISEASE CARRIER STATUS: Chronic | Status: ACTIVE | Noted: 2021-12-06

## 2021-12-09 PROBLEM — C50.912 MALIGNANT NEOPLASM OF UNSPECIFIED SITE OF LEFT FEMALE BREAST: Chronic | Status: ACTIVE | Noted: 2021-09-23

## 2021-12-09 PROBLEM — C50.911 MALIGNANT NEOPLASM OF UNSPECIFIED SITE OF RIGHT FEMALE BREAST: Chronic | Status: ACTIVE | Noted: 2021-12-06

## 2021-12-09 PROBLEM — K21.9 GASTRO-ESOPHAGEAL REFLUX DISEASE WITHOUT ESOPHAGITIS: Chronic | Status: ACTIVE | Noted: 2021-12-06

## 2021-12-09 PROBLEM — M54.9 DORSALGIA, UNSPECIFIED: Chronic | Status: ACTIVE | Noted: 2021-12-06

## 2021-12-14 ENCOUNTER — FORM ENCOUNTER (OUTPATIENT)
Age: 51
End: 2021-12-14

## 2021-12-15 ENCOUNTER — APPOINTMENT (OUTPATIENT)
Dept: GYNECOLOGIC ONCOLOGY | Facility: CLINIC | Age: 51
End: 2021-12-15
Payer: COMMERCIAL

## 2021-12-15 VITALS
WEIGHT: 210 LBS | DIASTOLIC BLOOD PRESSURE: 82 MMHG | OXYGEN SATURATION: 95 % | HEIGHT: 64 IN | SYSTOLIC BLOOD PRESSURE: 116 MMHG | HEART RATE: 72 BPM | BODY MASS INDEX: 35.85 KG/M2

## 2021-12-15 DIAGNOSIS — Z80.1 FAMILY HISTORY OF MALIGNANT NEOPLASM OF TRACHEA, BRONCHUS AND LUNG: ICD-10-CM

## 2021-12-15 DIAGNOSIS — Z78.9 OTHER SPECIFIED HEALTH STATUS: ICD-10-CM

## 2021-12-15 DIAGNOSIS — Z72.0 TOBACCO USE: ICD-10-CM

## 2021-12-15 DIAGNOSIS — Z80.42 FAMILY HISTORY OF MALIGNANT NEOPLASM OF PROSTATE: ICD-10-CM

## 2021-12-15 DIAGNOSIS — Z87.891 PERSONAL HISTORY OF NICOTINE DEPENDENCE: ICD-10-CM

## 2021-12-15 DIAGNOSIS — N93.9 ABNORMAL UTERINE AND VAGINAL BLEEDING, UNSPECIFIED: ICD-10-CM

## 2021-12-15 DIAGNOSIS — Z80.3 FAMILY HISTORY OF MALIGNANT NEOPLASM OF BREAST: ICD-10-CM

## 2021-12-15 PROCEDURE — 76857 US EXAM PELVIC LIMITED: CPT | Mod: 59

## 2021-12-15 PROCEDURE — 99204 OFFICE O/P NEW MOD 45 MIN: CPT | Mod: 25

## 2021-12-15 PROCEDURE — 76830 TRANSVAGINAL US NON-OB: CPT | Mod: 59

## 2021-12-16 ENCOUNTER — TRANSCRIPTION ENCOUNTER (OUTPATIENT)
Age: 51
End: 2021-12-16

## 2021-12-16 PROBLEM — Z80.1 FAMILY HISTORY OF LUNG CANCER: Status: ACTIVE | Noted: 2021-12-16

## 2021-12-16 PROBLEM — Z87.891 FORMER SMOKER: Status: ACTIVE | Noted: 2021-12-16

## 2021-12-16 PROBLEM — Z78.9 SOCIAL ALCOHOL USE: Status: ACTIVE | Noted: 2021-12-16

## 2021-12-16 PROBLEM — Z80.42 FAMILY HISTORY OF MALIGNANT NEOPLASM OF PROSTATE: Status: ACTIVE | Noted: 2021-12-16

## 2021-12-16 PROBLEM — Z72.0 USE OF NICOTINE CONTAINING SUBSTANCE IN COMBUSTION-FREE VAPORIZATION DEVICE: Status: ACTIVE | Noted: 2021-12-16

## 2021-12-16 PROBLEM — N93.9 ABNORMAL UTERINE BLEEDING (AUB): Status: ACTIVE | Noted: 2021-12-16

## 2021-12-16 PROBLEM — Z80.3 FAMILY HISTORY OF MALIGNANT NEOPLASM OF BREAST: Status: ACTIVE | Noted: 2021-12-16

## 2021-12-16 RX ORDER — OMEPRAZOLE 40 MG/1
40 CAPSULE, DELAYED RELEASE ORAL
Qty: 1 | Refills: 0 | Status: DISCONTINUED | COMMUNITY
Start: 2019-08-15 | End: 2021-12-16

## 2021-12-16 RX ORDER — TIZANIDINE 2 MG/1
2 TABLET ORAL EVERY 6 HOURS
Qty: 30 | Refills: 2 | Status: DISCONTINUED | COMMUNITY
Start: 2018-11-23 | End: 2021-12-16

## 2021-12-16 RX ORDER — HALOBETASOL PROPIONATE 0.5 MG/G
0.05 OINTMENT TOPICAL
Qty: 1 | Refills: 1 | Status: DISCONTINUED | COMMUNITY
Start: 2019-11-04 | End: 2021-12-16

## 2021-12-16 RX ORDER — OXYCODONE AND ACETAMINOPHEN 5; 325 MG/1; MG/1
5-325 TABLET ORAL
Qty: 20 | Refills: 0 | Status: DISCONTINUED | COMMUNITY
Start: 2018-05-24 | End: 2021-12-16

## 2021-12-16 RX ORDER — CALCIPOTRIENE AND BETAMETHASONE DIPROPIONATE 50; .5 UG/G; MG/G
0.005-0.064 OINTMENT TOPICAL DAILY
Qty: 1 | Refills: 1 | Status: DISCONTINUED | COMMUNITY
Start: 2020-05-15 | End: 2021-12-16

## 2021-12-16 RX ORDER — DOXYCYCLINE HYCLATE 100 MG/1
100 CAPSULE ORAL
Qty: 60 | Refills: 2 | Status: DISCONTINUED | COMMUNITY
Start: 2019-12-02 | End: 2021-12-16

## 2021-12-16 RX ORDER — ALBUTEROL SULFATE 2.5 MG/3ML
(2.5 MG/3ML) SOLUTION RESPIRATORY (INHALATION)
Qty: 60 | Refills: 2 | Status: DISCONTINUED | COMMUNITY
Start: 2019-02-06 | End: 2021-12-16

## 2021-12-16 RX ORDER — PROMETHAZINE HYDROCHLORIDE AND DEXTROMETHORPHAN HYDROBROMIDE ORAL SOLUTION 15; 6.25 MG/5ML; MG/5ML
6.25-15 SOLUTION ORAL EVERY 6 HOURS
Qty: 120 | Refills: 1 | Status: DISCONTINUED | COMMUNITY
Start: 2019-02-06 | End: 2021-12-16

## 2021-12-17 ENCOUNTER — OUTPATIENT (OUTPATIENT)
Dept: OUTPATIENT SERVICES | Facility: HOSPITAL | Age: 51
LOS: 1 days | End: 2021-12-17
Payer: COMMERCIAL

## 2021-12-17 DIAGNOSIS — Z98.890 OTHER SPECIFIED POSTPROCEDURAL STATES: Chronic | ICD-10-CM

## 2021-12-17 DIAGNOSIS — Z98.51 TUBAL LIGATION STATUS: Chronic | ICD-10-CM

## 2021-12-17 DIAGNOSIS — Z11.52 ENCOUNTER FOR SCREENING FOR COVID-19: ICD-10-CM

## 2021-12-17 LAB — SARS-COV-2 RNA SPEC QL NAA+PROBE: SIGNIFICANT CHANGE UP

## 2021-12-17 PROCEDURE — C9803: CPT

## 2021-12-17 PROCEDURE — U0005: CPT

## 2021-12-17 PROCEDURE — U0003: CPT

## 2021-12-19 ENCOUNTER — TRANSCRIPTION ENCOUNTER (OUTPATIENT)
Age: 51
End: 2021-12-19

## 2021-12-20 ENCOUNTER — APPOINTMENT (OUTPATIENT)
Dept: NUCLEAR MEDICINE | Facility: HOSPITAL | Age: 51
End: 2021-12-20

## 2021-12-20 ENCOUNTER — RESULT REVIEW (OUTPATIENT)
Age: 51
End: 2021-12-20

## 2021-12-20 ENCOUNTER — INPATIENT (INPATIENT)
Facility: HOSPITAL | Age: 51
LOS: 1 days | Discharge: HOME | DRG: 581 | End: 2021-12-22
Attending: SURGERY | Admitting: SURGERY
Payer: COMMERCIAL

## 2021-12-20 VITALS
HEIGHT: 64 IN | SYSTOLIC BLOOD PRESSURE: 115 MMHG | HEART RATE: 73 BPM | WEIGHT: 212.08 LBS | DIASTOLIC BLOOD PRESSURE: 80 MMHG | OXYGEN SATURATION: 96 % | TEMPERATURE: 98 F | RESPIRATION RATE: 18 BRPM

## 2021-12-20 DIAGNOSIS — C50.812 MALIGNANT NEOPLASM OF OVERLAPPING SITES OF LEFT FEMALE BREAST: ICD-10-CM

## 2021-12-20 DIAGNOSIS — Z98.890 OTHER SPECIFIED POSTPROCEDURAL STATES: Chronic | ICD-10-CM

## 2021-12-20 DIAGNOSIS — Z98.51 TUBAL LIGATION STATUS: Chronic | ICD-10-CM

## 2021-12-20 LAB
ANION GAP SERPL CALC-SCNC: 13 MMOL/L — SIGNIFICANT CHANGE UP (ref 5–17)
BUN SERPL-MCNC: 9 MG/DL — SIGNIFICANT CHANGE UP (ref 7–23)
CALCIUM SERPL-MCNC: 8 MG/DL — LOW (ref 8.4–10.5)
CHLORIDE SERPL-SCNC: 104 MMOL/L — SIGNIFICANT CHANGE UP (ref 96–108)
CO2 SERPL-SCNC: 23 MMOL/L — SIGNIFICANT CHANGE UP (ref 22–31)
CREAT SERPL-MCNC: 0.59 MG/DL — SIGNIFICANT CHANGE UP (ref 0.5–1.3)
GLUCOSE SERPL-MCNC: 173 MG/DL — HIGH (ref 70–99)
HCT VFR BLD CALC: 33.6 % — LOW (ref 34.5–45)
HGB BLD-MCNC: 11.3 G/DL — LOW (ref 11.5–15.5)
MCHC RBC-ENTMCNC: 31.2 PG — SIGNIFICANT CHANGE UP (ref 27–34)
MCHC RBC-ENTMCNC: 33.6 GM/DL — SIGNIFICANT CHANGE UP (ref 32–36)
MCV RBC AUTO: 92.8 FL — SIGNIFICANT CHANGE UP (ref 80–100)
NRBC # BLD: 0 /100 WBCS — SIGNIFICANT CHANGE UP (ref 0–0)
PLATELET # BLD AUTO: 228 K/UL — SIGNIFICANT CHANGE UP (ref 150–400)
POTASSIUM SERPL-MCNC: 4 MMOL/L — SIGNIFICANT CHANGE UP (ref 3.5–5.3)
POTASSIUM SERPL-SCNC: 4 MMOL/L — SIGNIFICANT CHANGE UP (ref 3.5–5.3)
RBC # BLD: 3.62 M/UL — LOW (ref 3.8–5.2)
RBC # FLD: 12.1 % — SIGNIFICANT CHANGE UP (ref 10.3–14.5)
SODIUM SERPL-SCNC: 140 MMOL/L — SIGNIFICANT CHANGE UP (ref 135–145)
WBC # BLD: 13.77 K/UL — HIGH (ref 3.8–10.5)
WBC # FLD AUTO: 13.77 K/UL — HIGH (ref 3.8–10.5)

## 2021-12-20 PROCEDURE — 88331 PATH CONSLTJ SURG 1 BLK 1SPC: CPT | Mod: 26

## 2021-12-20 PROCEDURE — 88307 TISSUE EXAM BY PATHOLOGIST: CPT | Mod: 26

## 2021-12-20 PROCEDURE — 88302 TISSUE EXAM BY PATHOLOGIST: CPT | Mod: 26

## 2021-12-20 PROCEDURE — 88305 TISSUE EXAM BY PATHOLOGIST: CPT | Mod: 26

## 2021-12-20 RX ORDER — OXYCODONE HYDROCHLORIDE 5 MG/1
10 TABLET ORAL EVERY 4 HOURS
Refills: 0 | Status: DISCONTINUED | OUTPATIENT
Start: 2021-12-20 | End: 2021-12-22

## 2021-12-20 RX ORDER — LIDOCAINE HCL 20 MG/ML
0.2 VIAL (ML) INJECTION ONCE
Refills: 0 | Status: DISCONTINUED | OUTPATIENT
Start: 2021-12-20 | End: 2021-12-20

## 2021-12-20 RX ORDER — SODIUM CHLORIDE 9 MG/ML
1000 INJECTION, SOLUTION INTRAVENOUS
Refills: 0 | Status: DISCONTINUED | OUTPATIENT
Start: 2021-12-20 | End: 2021-12-21

## 2021-12-20 RX ORDER — CHLORHEXIDINE GLUCONATE 213 G/1000ML
1 SOLUTION TOPICAL ONCE
Refills: 0 | Status: DISCONTINUED | OUTPATIENT
Start: 2021-12-20 | End: 2021-12-20

## 2021-12-20 RX ORDER — SODIUM CHLORIDE 9 MG/ML
3 INJECTION INTRAMUSCULAR; INTRAVENOUS; SUBCUTANEOUS EVERY 8 HOURS
Refills: 0 | Status: DISCONTINUED | OUTPATIENT
Start: 2021-12-20 | End: 2021-12-20

## 2021-12-20 RX ORDER — OXYCODONE HYDROCHLORIDE 5 MG/1
5 TABLET ORAL EVERY 4 HOURS
Refills: 0 | Status: DISCONTINUED | OUTPATIENT
Start: 2021-12-20 | End: 2021-12-22

## 2021-12-20 RX ORDER — KETOROLAC TROMETHAMINE 30 MG/ML
10 SYRINGE (ML) INJECTION EVERY 6 HOURS
Refills: 0 | Status: DISCONTINUED | OUTPATIENT
Start: 2021-12-20 | End: 2021-12-21

## 2021-12-20 RX ORDER — HYDROMORPHONE HYDROCHLORIDE 2 MG/ML
0.5 INJECTION INTRAMUSCULAR; INTRAVENOUS; SUBCUTANEOUS
Refills: 0 | Status: DISCONTINUED | OUTPATIENT
Start: 2021-12-20 | End: 2021-12-20

## 2021-12-20 RX ORDER — HEPARIN SODIUM 5000 [USP'U]/ML
5000 INJECTION INTRAVENOUS; SUBCUTANEOUS EVERY 12 HOURS
Refills: 0 | Status: DISCONTINUED | OUTPATIENT
Start: 2021-12-20 | End: 2021-12-21

## 2021-12-20 RX ORDER — ACETAMINOPHEN 500 MG
1000 TABLET ORAL EVERY 8 HOURS
Refills: 0 | Status: COMPLETED | OUTPATIENT
Start: 2021-12-20 | End: 2021-12-21

## 2021-12-20 RX ORDER — APREPITANT 80 MG/1
40 CAPSULE ORAL ONCE
Refills: 0 | Status: COMPLETED | OUTPATIENT
Start: 2021-12-20 | End: 2021-12-20

## 2021-12-20 RX ORDER — ACETAMINOPHEN 500 MG
975 TABLET ORAL EVERY 8 HOURS
Refills: 0 | Status: COMPLETED | OUTPATIENT
Start: 2021-12-20 | End: 2022-11-18

## 2021-12-20 RX ORDER — CEFAZOLIN SODIUM 1 G
2000 VIAL (EA) INJECTION EVERY 8 HOURS
Refills: 0 | Status: COMPLETED | OUTPATIENT
Start: 2021-12-20 | End: 2021-12-21

## 2021-12-20 RX ORDER — KETOROLAC TROMETHAMINE 30 MG/ML
15 SYRINGE (ML) INJECTION EVERY 6 HOURS
Refills: 0 | Status: DISCONTINUED | OUTPATIENT
Start: 2021-12-20 | End: 2021-12-21

## 2021-12-20 RX ORDER — ONDANSETRON 8 MG/1
4 TABLET, FILM COATED ORAL ONCE
Refills: 0 | Status: COMPLETED | OUTPATIENT
Start: 2021-12-20 | End: 2021-12-20

## 2021-12-20 RX ADMIN — APREPITANT 40 MILLIGRAM(S): 80 CAPSULE ORAL at 06:58

## 2021-12-20 RX ADMIN — HYDROMORPHONE HYDROCHLORIDE 0.5 MILLIGRAM(S): 2 INJECTION INTRAMUSCULAR; INTRAVENOUS; SUBCUTANEOUS at 18:15

## 2021-12-20 RX ADMIN — HEPARIN SODIUM 5000 UNIT(S): 5000 INJECTION INTRAVENOUS; SUBCUTANEOUS at 19:45

## 2021-12-20 RX ADMIN — ONDANSETRON 4 MILLIGRAM(S): 8 TABLET, FILM COATED ORAL at 20:14

## 2021-12-20 RX ADMIN — OXYCODONE HYDROCHLORIDE 10 MILLIGRAM(S): 5 TABLET ORAL at 21:00

## 2021-12-20 RX ADMIN — SODIUM CHLORIDE 125 MILLILITER(S): 9 INJECTION, SOLUTION INTRAVENOUS at 23:17

## 2021-12-20 RX ADMIN — OXYCODONE HYDROCHLORIDE 10 MILLIGRAM(S): 5 TABLET ORAL at 20:15

## 2021-12-20 RX ADMIN — Medication 1000 MILLIGRAM(S): at 23:47

## 2021-12-20 RX ADMIN — HYDROMORPHONE HYDROCHLORIDE 0.5 MILLIGRAM(S): 2 INJECTION INTRAMUSCULAR; INTRAVENOUS; SUBCUTANEOUS at 17:00

## 2021-12-20 RX ADMIN — Medication 400 MILLIGRAM(S): at 23:17

## 2021-12-20 RX ADMIN — HYDROMORPHONE HYDROCHLORIDE 0.5 MILLIGRAM(S): 2 INJECTION INTRAMUSCULAR; INTRAVENOUS; SUBCUTANEOUS at 18:00

## 2021-12-20 RX ADMIN — HYDROMORPHONE HYDROCHLORIDE 0.5 MILLIGRAM(S): 2 INJECTION INTRAMUSCULAR; INTRAVENOUS; SUBCUTANEOUS at 17:15

## 2021-12-20 RX ADMIN — Medication 100 MILLIGRAM(S): at 19:45

## 2021-12-20 RX ADMIN — SODIUM CHLORIDE 125 MILLILITER(S): 9 INJECTION, SOLUTION INTRAVENOUS at 17:00

## 2021-12-20 NOTE — PRE-ANESTHESIA EVALUATION ADULT - NSPROPOSEDPROCEDFT_GEN_ALL_CORE
Bilateral nipple areola sparing total mastectomies  right axillary sentinel lymph node biopsy  bilateral ALESSANDRO flap  bilateral nerve graft  possible axillary lymph node dissection.

## 2021-12-20 NOTE — BRIEF OPERATIVE NOTE - NSICDXBRIEFPREOP_GEN_ALL_CORE_FT
PRE-OP DIAGNOSIS:  Cancer of midline of left female breast 20-Dec-2021 11:33:49  Palleschi, Susan M  Intraductal carcinoma of right breast 20-Dec-2021 11:34:01  Palleschi, Susan M  
PRE-OP DIAGNOSIS:  Cancer of midline of left female breast 20-Dec-2021 11:33:49  Palleschi, Susan M  Intraductal carcinoma of right breast 20-Dec-2021 11:34:01  Palleschi, Susan M

## 2021-12-20 NOTE — BRIEF OPERATIVE NOTE - COMMENTS
postop plan:  -NPO  -strict bedrest  -perry  -pain/nausea control PRN  -NO moving/rolling/rocking/transferring physical bed  -ICU level of care for q1h flap checks

## 2021-12-20 NOTE — CHART NOTE - NSCHARTNOTEFT_GEN_A_CORE
POST-OP NOTE    JOSH MYLES | [id number] | [facility] 8ICU 14    Procedure: s/p Bilateral mastectomy with sentinel node biopsy + ALESSANDRO     Subjective: Patient seen and examined at bedside. Patient complains of mild incisional pain. denies nausea or vomiting.     Vital Signs Last 24 Hrs  T(C): 36.2 (20 Dec 2021 23:00), Max: 36.9 (20 Dec 2021 16:35)  T(F): 97.2 (20 Dec 2021 23:00), Max: 98.4 (20 Dec 2021 16:35)  HR: 73 (21 Dec 2021 03:00) (63 - 101)  BP: 87/50 (21 Dec 2021 03:00) (81/51 - 117/65)  BP(mean): 62 (21 Dec 2021 03:00) (61 - 87)  RR: 21 (21 Dec 2021 03:00) (13 - 21)  SpO2: 97% (21 Dec 2021 03:00) (95% - 100%)      I&O's Summary    20 Dec 2021 07:01  -  21 Dec 2021 03:29  --------------------------------------------------------  IN: 1355 mL / OUT: 1335 mL / NET: 20 mL                            11.3   13.77 )-----------( 228      ( 20 Dec 2021 18:42 )             33.6     12-20    140  |  104  |  9   ----------------------------<  173<H>  4.0   |  23  |  0.59    Ca    8.0<L>      20 Dec 2021 18:42         PHYSICAL EXAM:  Gen: NAD  Resp: breathing easily, no stridor  Chets: periincisional tenderness, SS drain output, incisions c/d/i, Non-distended.  CV: RRR  Abdomen: Abdomen soft.

## 2021-12-20 NOTE — BRIEF OPERATIVE NOTE - OPERATION/FINDINGS
Intraoperative frozen section of right axillary sentinel LNs negative
please see full op note for details & separate breast surgery note for initial portion of procedure    -preop: BL breast cancer  -postop: same    procedure: immediate BL breast recon w/ free neurotized ALESSANDRO flaps; TAP blocks

## 2021-12-20 NOTE — PRE-ANESTHESIA EVALUATION ADULT - NSANTHOSAYNRD_GEN_A_CORE
No. BRIANA screening performed.  STOP BANG Legend: 0-2 = LOW Risk; 3-4 = INTERMEDIATE Risk; 5-8 = HIGH Risk Patient requests all Lab and Radiology Results on their Discharge Instructions

## 2021-12-20 NOTE — PATIENT PROFILE ADULT - FALL HARM RISK - UNIVERSAL INTERVENTIONS
Bed in lowest position, wheels locked, appropriate side rails in place/Call bell, personal items and telephone in reach/Instruct patient to call for assistance before getting out of bed or chair/Non-slip footwear when patient is out of bed/[place] to call system/Physically safe environment - no spills, clutter or unnecessary equipment/Purposeful Proactive Rounding/Room/bathroom lighting operational, light cord in reach

## 2021-12-20 NOTE — BRIEF OPERATIVE NOTE - NSICDXBRIEFPROCEDURE_GEN_ALL_CORE_FT
PROCEDURES:  Breast reconstruction with ALESSANDRO free flap 20-Dec-2021 16:55:00  Gino Auguste  Breast reconstruction with ALESSANDRO free flap 20-Dec-2021 16:55:15  Gino Auguste  Block, transversus abdominis plane, bilateral 20-Dec-2021 16:55:42  Gino Auguste  
PROCEDURES:  Bilateral mastectomy with sentinel node biopsy 20-Dec-2021 11:33:04 bilateral nipple areolar sparing, right axillary sentinel lymph node biopsy Palleschi, Susan M

## 2021-12-20 NOTE — BRIEF OPERATIVE NOTE - NSICDXBRIEFPOSTOP_GEN_ALL_CORE_FT
POST-OP DIAGNOSIS:  Carcinoma of midline of left breast 20-Dec-2021 11:34:17  Palleschi, Susan M  Intraductal carcinoma of right breast 20-Dec-2021 11:34:58  Palleschi, Susan M  
POST-OP DIAGNOSIS:  Carcinoma of midline of left breast 20-Dec-2021 11:34:17  Palleschi, Susan M  Intraductal carcinoma of right breast 20-Dec-2021 11:34:58  Palleschi, Susan M

## 2021-12-21 RX ORDER — ENOXAPARIN SODIUM 100 MG/ML
40 INJECTION SUBCUTANEOUS DAILY
Refills: 0 | Status: DISCONTINUED | OUTPATIENT
Start: 2021-12-21 | End: 2021-12-22

## 2021-12-21 RX ORDER — ACETAMINOPHEN 500 MG
975 TABLET ORAL EVERY 8 HOURS
Refills: 0 | Status: DISCONTINUED | OUTPATIENT
Start: 2021-12-21 | End: 2021-12-22

## 2021-12-21 RX ORDER — SENNA PLUS 8.6 MG/1
2 TABLET ORAL AT BEDTIME
Refills: 0 | Status: DISCONTINUED | OUTPATIENT
Start: 2021-12-21 | End: 2021-12-22

## 2021-12-21 RX ORDER — POLYETHYLENE GLYCOL 3350 17 G/17G
17 POWDER, FOR SOLUTION ORAL DAILY
Refills: 0 | Status: DISCONTINUED | OUTPATIENT
Start: 2021-12-21 | End: 2021-12-22

## 2021-12-21 RX ORDER — IBUPROFEN 200 MG
400 TABLET ORAL EVERY 6 HOURS
Refills: 0 | Status: DISCONTINUED | OUTPATIENT
Start: 2021-12-21 | End: 2021-12-22

## 2021-12-21 RX ADMIN — OXYCODONE HYDROCHLORIDE 10 MILLIGRAM(S): 5 TABLET ORAL at 00:46

## 2021-12-21 RX ADMIN — Medication 400 MILLIGRAM(S): at 11:13

## 2021-12-21 RX ADMIN — Medication 975 MILLIGRAM(S): at 14:01

## 2021-12-21 RX ADMIN — ENOXAPARIN SODIUM 40 MILLIGRAM(S): 100 INJECTION SUBCUTANEOUS at 11:13

## 2021-12-21 RX ADMIN — OXYCODONE HYDROCHLORIDE 10 MILLIGRAM(S): 5 TABLET ORAL at 12:29

## 2021-12-21 RX ADMIN — OXYCODONE HYDROCHLORIDE 10 MILLIGRAM(S): 5 TABLET ORAL at 07:48

## 2021-12-21 RX ADMIN — Medication 15 MILLIGRAM(S): at 06:37

## 2021-12-21 RX ADMIN — Medication 15 MILLIGRAM(S): at 00:41

## 2021-12-21 RX ADMIN — Medication 400 MILLIGRAM(S): at 21:41

## 2021-12-21 RX ADMIN — Medication 400 MILLIGRAM(S): at 06:07

## 2021-12-21 RX ADMIN — OXYCODONE HYDROCHLORIDE 10 MILLIGRAM(S): 5 TABLET ORAL at 08:18

## 2021-12-21 RX ADMIN — OXYCODONE HYDROCHLORIDE 10 MILLIGRAM(S): 5 TABLET ORAL at 18:39

## 2021-12-21 RX ADMIN — OXYCODONE HYDROCHLORIDE 10 MILLIGRAM(S): 5 TABLET ORAL at 12:01

## 2021-12-21 RX ADMIN — Medication 400 MILLIGRAM(S): at 19:05

## 2021-12-21 RX ADMIN — Medication 1000 MILLIGRAM(S): at 06:37

## 2021-12-21 RX ADMIN — OXYCODONE HYDROCHLORIDE 10 MILLIGRAM(S): 5 TABLET ORAL at 01:16

## 2021-12-21 RX ADMIN — OXYCODONE HYDROCHLORIDE 10 MILLIGRAM(S): 5 TABLET ORAL at 18:09

## 2021-12-21 RX ADMIN — Medication 15 MILLIGRAM(S): at 06:07

## 2021-12-21 RX ADMIN — Medication 975 MILLIGRAM(S): at 14:27

## 2021-12-21 RX ADMIN — Medication 400 MILLIGRAM(S): at 23:32

## 2021-12-21 RX ADMIN — Medication 15 MILLIGRAM(S): at 01:11

## 2021-12-21 RX ADMIN — Medication 400 MILLIGRAM(S): at 17:31

## 2021-12-21 RX ADMIN — POLYETHYLENE GLYCOL 3350 17 GRAM(S): 17 POWDER, FOR SOLUTION ORAL at 11:13

## 2021-12-21 RX ADMIN — Medication 100 MILLIGRAM(S): at 04:16

## 2021-12-21 RX ADMIN — SENNA PLUS 2 TABLET(S): 8.6 TABLET ORAL at 21:41

## 2021-12-21 RX ADMIN — Medication 400 MILLIGRAM(S): at 11:43

## 2021-12-21 NOTE — CONSULT NOTE ADULT - ASSESSMENT
ASSESSMENT:  51y Female s/p b/l mastectomy and ALESSANDOR flap reconstruction, here in SICU for q1 flap checks.    PLAN:   Neurologic: acute post op pain control  - tylenol, toradol, dilaudid and oxy PRN    Respiratory:   - keep 4L nasal cannula  - monitor spo2    Cardiovascular:   - hemodynamically stable  - monitor vitals    Gastrointestinal/Nutrition:   - NPO x meds    Renal/Genitourinary:   -   - strict I's and O's    Hematologic:   - monitor H/H  - HSQ 5000 q12 for chemoppx    Infectious Disease:   - periop ancef    Endocrine:   - monitor blood glucose    Disposition:   - SICU  --------------------------------------------------------------------------------------

## 2021-12-21 NOTE — PROGRESS NOTE ADULT - SUBJECTIVE AND OBJECTIVE BOX
Surgery Progress Note    SUBJECTIVE: Patient seen and examined at bedside with surgical team. No acute events overnight. Pain controlled with medications. No n/v.     OBJECTIVE:    Vital Signs Last 24 Hrs  T(C): 36.3 (21 Dec 2021 03:00), Max: 36.9 (20 Dec 2021 16:35)  T(F): 97.3 (21 Dec 2021 03:00), Max: 98.4 (20 Dec 2021 16:35)  HR: 74 (21 Dec 2021 06:00) (63 - 101)  BP: 85/53 (21 Dec 2021 06:00) (81/51 - 117/65)  BP(mean): 65 (21 Dec 2021 06:00) (61 - 87)  RR: 32 (21 Dec 2021 06:00) (13 - 32)  SpO2: 99% (21 Dec 2021 06:00) (95% - 100%)I&O's Detail    20 Dec 2021 07:01  -  21 Dec 2021 06:52  --------------------------------------------------------  IN:    IV PiggyBack: 300 mL    Lactated Ringers: 1690 mL    Oral Fluid: 15 mL  Total IN: 2005 mL    OUT:    Bulb (mL): 60 mL    Bulb (mL): 65 mL    Bulb (mL): 50 mL    Bulb (mL): 40 mL    Bulb (mL): 55 mL    Bulb (mL): 30 mL    Indwelling Catheter - Urethral (mL): 1385 mL  Total OUT: 1685 mL    Total NET: 320 mL      MEDICATIONS  (STANDING):  acetaminophen     Tablet .. 975 milliGRAM(s) Oral every 8 hours  acetaminophen   IVPB .. 1000 milliGRAM(s) IV Intermittent every 8 hours  enoxaparin Injectable 40 milliGRAM(s) SubCutaneous daily  ibuprofen  Tablet. 400 milliGRAM(s) Oral every 6 hours  polyethylene glycol 3350 17 Gram(s) Oral daily  senna 2 Tablet(s) Oral at bedtime    MEDICATIONS  (PRN):  oxyCODONE    IR 5 milliGRAM(s) Oral every 4 hours PRN Moderate Pain (4 - 6)  oxyCODONE    IR 10 milliGRAM(s) Oral every 4 hours PRN Severe Pain (7 - 10)      PHYSICAL EXAM:  General: NAD, Lying in bed comfortably  Neuro: Awake and alert  Breast: L: soft, spotted ecchymosis throughout mastectomy flaps, no evidence of congestion or ischemia to flap, normal skin turgor, cap refill intact, incisions clean and dry. BENITEZ drain s/s, slightly more sang than serous.                 R:soft, spotted ecchymosis throughout mastectomy flaps, no evidence of congestion or ischemia to flap, normal skin turgor, cap refill intact, incisions clean and dry. BENITEZ drain s/s, slightly more sang than serous.   Extremity: MAS  GI/Abd: Soft, incision intact with prineo, clean and dry. JPs s/s NT/ND,     LABS:                        11.3   13.77 )-----------( 228      ( 20 Dec 2021 18:42 )             33.6     12-20    140  |  104  |  9   ----------------------------<  173<H>  4.0   |  23  |  0.59    Ca    8.0<L>      20 Dec 2021 18:42            ABO Interpretation: A (12-20-21 @ 07:04)

## 2021-12-21 NOTE — CONSULT NOTE ADULT - SUBJECTIVE AND OBJECTIVE BOX
SICU Consultation Note  =====================================================  HPI: 51y Female  HPI:  51 yr old female newly dx with bilateral breast cancer s/p lumpectomy and reduction. Patient now s/p b/l mastectomy and ALESSANDRO flap reconstruction upgraded to SICU post op for close monitoring and flap checks.    PAST MEDICAL & SURGICAL HISTORY:  Plaque psoriasis    H/O vertigo    Ductal carcinoma of left breast  invasive ductal stage 1  ER positive to start medication s/p surgery    Joint pain    Anxiety    Breast cancer, right  stage 0 found during reduction    BRCA negative    Chronic back pain    GERD (gastroesophageal reflux disease)    Status post bilateral breast reduction  1996 9/27/2021 s/p this surgery had infection requiring antibiotics    H/O tubal ligation  2005    S/P breast lumpectomy  left 9/27/21  lymph node biopsy negative    S/P colonoscopic polypectomy  2021 benign      Home Meds: Home Medications:  folic acid 1 mg oral tablet: 1 tab(s) orally once a day (20 Dec 2021 06:52)  Multiple Vitamins oral tablet: 1 tab(s) orally once a day (20 Dec 2021 06:52)  NexIUM 20 mg oral delayed release capsule: 1 cap(s) orally once a day, As Needed (20 Dec 2021 06:52)  Stelara 45 mg/0.5 mL subcutaneous solution: 90 milliliter(s) subcutaneous every 3 months (20 Dec 2021 06:52)  Wynzora 0.005%-0.064% topical cream: Apply topically to affected area once a day (20 Dec 2021 06:52)  Xanax 0.5 mg oral tablet: 1 tab(s) orally 3 times a day, As Needed (20 Dec 2021 06:52)    Allergies: Allergies  No Known Allergies  Intolerances    Soc:   Advanced Directives: Presumed Full Code     ROS:    REVIEW OF SYSTEMS    [ x ] A ten-point review of systems was otherwise negative except as noted.  [ ] Due to altered mental status/intubation, subjective information were not able to be obtained from the patient. History was obtained, to the extent possible, from review of the chart and collateral sources of information.      CURRENT MEDICATIONS:   --------------------------------------------------------------------------------------  Neurologic Medications  acetaminophen     Tablet .. 975 milliGRAM(s) Oral every 8 hours  acetaminophen   IVPB .. 1000 milliGRAM(s) IV Intermittent every 8 hours  ketorolac 10 milliGRAM(s) Oral every 6 hours  ketorolac   Injectable 15 milliGRAM(s) IV Push every 6 hours  oxyCODONE    IR 5 milliGRAM(s) Oral every 4 hours PRN Moderate Pain (4 - 6)  oxyCODONE    IR 10 milliGRAM(s) Oral every 4 hours PRN Severe Pain (7 - 10)    Respiratory Medications    Cardiovascular Medications    Gastrointestinal Medications  lactated ringers. 1000 milliLiter(s) IV Continuous <Continuous>    Genitourinary Medications    Hematologic/Oncologic Medications  heparin   Injectable 5000 Unit(s) SubCutaneous every 12 hours    Antimicrobial/Immunologic Medications  ceFAZolin   IVPB 2000 milliGRAM(s) IV Intermittent every 8 hours    Endocrine/Metabolic Medications    Topical/Other Medications    --------------------------------------------------------------------------------------    VITAL SIGNS, INS/OUTS (last 24 hours):  --------------------------------------------------------------------------------------  ICU Vital Signs Last 24 Hrs  T(C): 36.2 (20 Dec 2021 23:00), Max: 36.9 (20 Dec 2021 16:35)  T(F): 97.2 (20 Dec 2021 23:00), Max: 98.4 (20 Dec 2021 16:35)  HR: 76 (20 Dec 2021 23:00) (73 - 101)  BP: 96/55 (20 Dec 2021 23:00) (91/52 - 117/65)  BP(mean): 68 (20 Dec 2021 23:00) (66 - 87)  ABP: --  ABP(mean): --  RR: 14 (20 Dec 2021 23:00) (13 - 20)  SpO2: 96% (20 Dec 2021 23:00) (95% - 100%)    I&O's Summary    20 Dec 2021 07:01  -  21 Dec 2021 00:48  --------------------------------------------------------  IN: 865 mL / OUT: 1170 mL / NET: -305 mL      --------------------------------------------------------------------------------------    EXAM:  General/Neuro  RASS:   GCS:   Exam: Normal, NAD, alert, oriented x 3, no focal deficits. PERRLA    Respiratory  Exam: Lungs clear to auscultation, Normal expansion/effort.  [] Tracheostomy   [] Intubated  Mechanical Ventilation:     Cardiovascular  Exam: S1, S2.  Regular rate and rhythm.  Cardiac Rhythm: Normal Sinus Rhythm    GI  Exam: Abdomen soft, periincisional tenderness, SS drain output, incisions c/d/i, Non-distended.      Tubes/Lines/Drains  [x] Peripheral IV    Extremities  Exam: Extremities warm, pink, well-perfused.      Derm:  Exam: Good skin turgor, no skin breakdown.      LABS  --------------------------------------------------------------------------------------  Labs:  CAPILLARY BLOOD GLUCOSE                  11.3   13.77 )-----------( 228      ( 20 Dec 2021 18:42 )             33.6         12-20    140  |  104  |  9   ----------------------------<  173<H>  4.0   |  23  |  0.59      Calcium, Total Serum: 8.0 mg/dL (12-20-21 @ 18:42)  --------------------------------------------------------------------------------------

## 2021-12-21 NOTE — PROGRESS NOTE ADULT - SUBJECTIVE AND OBJECTIVE BOX
Doing well  Pain controlled        MEDICATIONS  (STANDING):  acetaminophen     Tablet .. 975 milliGRAM(s) Oral every 8 hours  acetaminophen   IVPB .. 1000 milliGRAM(s) IV Intermittent every 8 hours  enoxaparin Injectable 40 milliGRAM(s) SubCutaneous daily  ibuprofen  Tablet. 400 milliGRAM(s) Oral every 6 hours    MEDICATIONS  (PRN):  oxyCODONE    IR 5 milliGRAM(s) Oral every 4 hours PRN Moderate Pain (4 - 6)  oxyCODONE    IR 10 milliGRAM(s) Oral every 4 hours PRN Severe Pain (7 - 10)      Vital Signs Last 24 Hrs  T(C): 36.3 (21 Dec 2021 03:00), Max: 36.9 (20 Dec 2021 16:35)  T(F): 97.3 (21 Dec 2021 03:00), Max: 98.4 (20 Dec 2021 16:35)  HR: 74 (21 Dec 2021 06:00) (63 - 101)  BP: 85/53 (21 Dec 2021 06:00) (81/51 - 117/65)  BP(mean): 65 (21 Dec 2021 06:00) (61 - 87)  RR: 32 (21 Dec 2021 06:00) (13 - 32)  SpO2: 99% (21 Dec 2021 06:00) (95% - 100%)    I&O's Detail    20 Dec 2021 07:01  -  21 Dec 2021 06:25  --------------------------------------------------------  IN:    IV PiggyBack: 300 mL    Lactated Ringers: 1690 mL    Oral Fluid: 15 mL  Total IN: 2005 mL    OUT:    Bulb (mL): 60 mL    Bulb (mL): 65 mL    Bulb (mL): 50 mL    Bulb (mL): 40 mL    Bulb (mL): 55 mL    Bulb (mL): 30 mL    Indwelling Catheter - Urethral (mL): 1385 mL  Total OUT: 1685 mL    Total NET: 320 mL              Exam  Breasts: Incisions clean, dry and intact.  No collections.  No erythema.  Skin viable.    Flaps:  Soft.  Capillary refill 2-3 seconds.  Viable  Viopitx:  R70, L52    Abdomen:  Incision clean, dry and intact.   No collections.  No erythema.  Skin viable.      Assessment and Plan    Ambulate  DC perry  Hep lock IVF  Regular diet  q2hr flap checks  Continue vioptix

## 2021-12-21 NOTE — PROGRESS NOTE ADULT - SUBJECTIVE AND OBJECTIVE BOX
TEAM B Surgery Progress Note    INTERVAL EVENTS:   No acute events overnight.    SUBJECTIVE: Patient seen and examined at bedside with surgical team, patient without complaints. Denies fever, chills, CP, SOB nausea, vomiting. Pain well controlled.     OBJECTIVE:    Vital Signs Last 24 Hrs  T(C): 36.2 (20 Dec 2021 23:00), Max: 36.9 (20 Dec 2021 16:35)  T(F): 97.2 (20 Dec 2021 23:00), Max: 98.4 (20 Dec 2021 16:35)  HR: 73 (21 Dec 2021 03:00) (63 - 101)  BP: 87/50 (21 Dec 2021 03:00) (81/51 - 117/65)  BP(mean): 62 (21 Dec 2021 03:00) (61 - 87)  RR: 21 (21 Dec 2021 03:00) (13 - 21)  SpO2: 97% (21 Dec 2021 03:00) (95% - 100%)I&O's Detail    20 Dec 2021 07:01  -  21 Dec 2021 03:38  --------------------------------------------------------  IN:    IV PiggyBack: 150 mL    Lactated Ringers: 1190 mL    Oral Fluid: 15 mL  Total IN: 1355 mL    OUT:    Bulb (mL): 30 mL    Bulb (mL): 45 mL    Bulb (mL): 35 mL    Bulb (mL): 40 mL    Bulb (mL): 30 mL    Bulb (mL): 20 mL    Indwelling Catheter - Urethral (mL): 1135 mL  Total OUT: 1335 mL    Total NET: 20 mL      MEDICATIONS  (STANDING):  acetaminophen     Tablet .. 975 milliGRAM(s) Oral every 8 hours  acetaminophen   IVPB .. 1000 milliGRAM(s) IV Intermittent every 8 hours  ceFAZolin   IVPB 2000 milliGRAM(s) IV Intermittent every 8 hours  heparin   Injectable 5000 Unit(s) SubCutaneous every 12 hours  ketorolac 10 milliGRAM(s) Oral every 6 hours  ketorolac   Injectable 15 milliGRAM(s) IV Push every 6 hours  lactated ringers. 1000 milliLiter(s) (125 mL/Hr) IV Continuous <Continuous>    MEDICATIONS  (PRN):  oxyCODONE    IR 5 milliGRAM(s) Oral every 4 hours PRN Moderate Pain (4 - 6)  oxyCODONE    IR 10 milliGRAM(s) Oral every 4 hours PRN Severe Pain (7 - 10)      PHYSICAL EXAM:  Constitutional: A&Ox3, NAD  Respiratory: Unlabored breathing  Abdomen: Soft, nondistended, NTTP. No rebound or guarding.  Extremities: WWP, TILLMAN spontaneously    LABS:                        11.3   13.77 )-----------( 228      ( 20 Dec 2021 18:42 )             33.6     12-20    140  |  104  |  9   ----------------------------<  173<H>  4.0   |  23  |  0.59    Ca    8.0<L>      20 Dec 2021 18:42            ABO Interpretation: JUDY (12-20-21 @ 07:04)      IMAGING:     TEAM B Surgery Progress Note    INTERVAL EVENTS:   No acute events overnight.    SUBJECTIVE: Patient seen and examined at bedside with surgical team, patient without complaints. Denies fever, chills, CP, SOB nausea, vomiting. Pain well controlled.     OBJECTIVE:    Vital Signs Last 24 Hrs  T(C): 36.2 (20 Dec 2021 23:00), Max: 36.9 (20 Dec 2021 16:35)  T(F): 97.2 (20 Dec 2021 23:00), Max: 98.4 (20 Dec 2021 16:35)  HR: 73 (21 Dec 2021 03:00) (63 - 101)  BP: 87/50 (21 Dec 2021 03:00) (81/51 - 117/65)  BP(mean): 62 (21 Dec 2021 03:00) (61 - 87)  RR: 21 (21 Dec 2021 03:00) (13 - 21)  SpO2: 97% (21 Dec 2021 03:00) (95% - 100%)I&O's Detail    20 Dec 2021 07:01  -  21 Dec 2021 03:38  --------------------------------------------------------  IN:    IV PiggyBack: 150 mL    Lactated Ringers: 1190 mL    Oral Fluid: 15 mL  Total IN: 1355 mL    OUT:    Bulb (mL): 30 mL    Bulb (mL): 45 mL    Bulb (mL): 35 mL    Bulb (mL): 40 mL    Bulb (mL): 30 mL    Bulb (mL): 20 mL    Indwelling Catheter - Urethral (mL): 1135 mL  Total OUT: 1335 mL    Total NET: 20 mL      MEDICATIONS  (STANDING):  acetaminophen     Tablet .. 975 milliGRAM(s) Oral every 8 hours  acetaminophen   IVPB .. 1000 milliGRAM(s) IV Intermittent every 8 hours  ceFAZolin   IVPB 2000 milliGRAM(s) IV Intermittent every 8 hours  heparin   Injectable 5000 Unit(s) SubCutaneous every 12 hours  ketorolac 10 milliGRAM(s) Oral every 6 hours  ketorolac   Injectable 15 milliGRAM(s) IV Push every 6 hours  lactated ringers. 1000 milliLiter(s) (125 mL/Hr) IV Continuous <Continuous>    MEDICATIONS  (PRN):  oxyCODONE    IR 5 milliGRAM(s) Oral every 4 hours PRN Moderate Pain (4 - 6)  oxyCODONE    IR 10 milliGRAM(s) Oral every 4 hours PRN Severe Pain (7 - 10)      PHYSICAL EXAM:    Gen: NAD  Resp: breathing easily, no stridor  Chets: periincisional tenderness, SS drain output, incisions c/d/i, Non-distended.  CV: RRR  Abdomen: Abdomen soft.    LABS:                        11.3   13.77 )-----------( 228      ( 20 Dec 2021 18:42 )             33.6     12-20    140  |  104  |  9   ----------------------------<  173<H>  4.0   |  23  |  0.59    Ca    8.0<L>      20 Dec 2021 18:42            ABO Interpretation: A (12-20-21 @ 07:04)      IMAGING:

## 2021-12-22 ENCOUNTER — TRANSCRIPTION ENCOUNTER (OUTPATIENT)
Age: 51
End: 2021-12-22

## 2021-12-22 VITALS
RESPIRATION RATE: 18 BRPM | SYSTOLIC BLOOD PRESSURE: 105 MMHG | DIASTOLIC BLOOD PRESSURE: 71 MMHG | TEMPERATURE: 98 F | HEART RATE: 91 BPM | OXYGEN SATURATION: 91 %

## 2021-12-22 LAB
ANION GAP SERPL CALC-SCNC: 11 MMOL/L — SIGNIFICANT CHANGE UP (ref 5–17)
APTT BLD: 27.6 SEC — SIGNIFICANT CHANGE UP (ref 27.5–35.5)
BUN SERPL-MCNC: 14 MG/DL — SIGNIFICANT CHANGE UP (ref 7–23)
CALCIUM SERPL-MCNC: 8.6 MG/DL — SIGNIFICANT CHANGE UP (ref 8.4–10.5)
CHLORIDE SERPL-SCNC: 101 MMOL/L — SIGNIFICANT CHANGE UP (ref 96–108)
CO2 SERPL-SCNC: 25 MMOL/L — SIGNIFICANT CHANGE UP (ref 22–31)
CREAT SERPL-MCNC: 0.71 MG/DL — SIGNIFICANT CHANGE UP (ref 0.5–1.3)
GLUCOSE SERPL-MCNC: 107 MG/DL — HIGH (ref 70–99)
HCT VFR BLD CALC: 31.3 % — LOW (ref 34.5–45)
HGB BLD-MCNC: 10.2 G/DL — LOW (ref 11.5–15.5)
INR BLD: 0.9 RATIO — SIGNIFICANT CHANGE UP (ref 0.88–1.16)
MAGNESIUM SERPL-MCNC: 2.1 MG/DL — SIGNIFICANT CHANGE UP (ref 1.6–2.6)
MCHC RBC-ENTMCNC: 31.5 PG — SIGNIFICANT CHANGE UP (ref 27–34)
MCHC RBC-ENTMCNC: 32.6 GM/DL — SIGNIFICANT CHANGE UP (ref 32–36)
MCV RBC AUTO: 96.6 FL — SIGNIFICANT CHANGE UP (ref 80–100)
NRBC # BLD: 0 /100 WBCS — SIGNIFICANT CHANGE UP (ref 0–0)
PHOSPHATE SERPL-MCNC: 2.5 MG/DL — SIGNIFICANT CHANGE UP (ref 2.5–4.5)
PLATELET # BLD AUTO: 195 K/UL — SIGNIFICANT CHANGE UP (ref 150–400)
POTASSIUM SERPL-MCNC: 3.4 MMOL/L — LOW (ref 3.5–5.3)
POTASSIUM SERPL-SCNC: 3.4 MMOL/L — LOW (ref 3.5–5.3)
PROTHROM AB SERPL-ACNC: 10.9 SEC — SIGNIFICANT CHANGE UP (ref 10.6–13.6)
RBC # BLD: 3.24 M/UL — LOW (ref 3.8–5.2)
RBC # FLD: 12.6 % — SIGNIFICANT CHANGE UP (ref 10.3–14.5)
SODIUM SERPL-SCNC: 137 MMOL/L — SIGNIFICANT CHANGE UP (ref 135–145)
WBC # BLD: 6.72 K/UL — SIGNIFICANT CHANGE UP (ref 3.8–10.5)
WBC # FLD AUTO: 6.72 K/UL — SIGNIFICANT CHANGE UP (ref 3.8–10.5)

## 2021-12-22 PROCEDURE — 88307 TISSUE EXAM BY PATHOLOGIST: CPT

## 2021-12-22 PROCEDURE — 84100 ASSAY OF PHOSPHORUS: CPT

## 2021-12-22 PROCEDURE — A9541: CPT

## 2021-12-22 PROCEDURE — 88302 TISSUE EXAM BY PATHOLOGIST: CPT

## 2021-12-22 PROCEDURE — 88305 TISSUE EXAM BY PATHOLOGIST: CPT

## 2021-12-22 PROCEDURE — 85027 COMPLETE CBC AUTOMATED: CPT

## 2021-12-22 PROCEDURE — C9399: CPT

## 2021-12-22 PROCEDURE — 85610 PROTHROMBIN TIME: CPT

## 2021-12-22 PROCEDURE — 83735 ASSAY OF MAGNESIUM: CPT

## 2021-12-22 PROCEDURE — C1762: CPT

## 2021-12-22 PROCEDURE — C1781: CPT

## 2021-12-22 PROCEDURE — C1889: CPT

## 2021-12-22 PROCEDURE — 88331 PATH CONSLTJ SURG 1 BLK 1SPC: CPT

## 2021-12-22 PROCEDURE — P9041: CPT

## 2021-12-22 PROCEDURE — 80048 BASIC METABOLIC PNL TOTAL CA: CPT

## 2021-12-22 PROCEDURE — 85730 THROMBOPLASTIN TIME PARTIAL: CPT

## 2021-12-22 PROCEDURE — P9045: CPT

## 2021-12-22 RX ADMIN — OXYCODONE HYDROCHLORIDE 10 MILLIGRAM(S): 5 TABLET ORAL at 02:00

## 2021-12-22 RX ADMIN — OXYCODONE HYDROCHLORIDE 10 MILLIGRAM(S): 5 TABLET ORAL at 15:00

## 2021-12-22 RX ADMIN — Medication 400 MILLIGRAM(S): at 12:06

## 2021-12-22 RX ADMIN — ENOXAPARIN SODIUM 40 MILLIGRAM(S): 100 INJECTION SUBCUTANEOUS at 12:07

## 2021-12-22 RX ADMIN — POLYETHYLENE GLYCOL 3350 17 GRAM(S): 17 POWDER, FOR SOLUTION ORAL at 12:07

## 2021-12-22 RX ADMIN — Medication 975 MILLIGRAM(S): at 14:01

## 2021-12-22 RX ADMIN — Medication 975 MILLIGRAM(S): at 05:54

## 2021-12-22 RX ADMIN — OXYCODONE HYDROCHLORIDE 10 MILLIGRAM(S): 5 TABLET ORAL at 09:40

## 2021-12-22 RX ADMIN — OXYCODONE HYDROCHLORIDE 10 MILLIGRAM(S): 5 TABLET ORAL at 01:08

## 2021-12-22 RX ADMIN — OXYCODONE HYDROCHLORIDE 10 MILLIGRAM(S): 5 TABLET ORAL at 08:41

## 2021-12-22 RX ADMIN — OXYCODONE HYDROCHLORIDE 10 MILLIGRAM(S): 5 TABLET ORAL at 14:01

## 2021-12-22 NOTE — PROGRESS NOTE ADULT - SUBJECTIVE AND OBJECTIVE BOX
Pt awake alert  Comfortable c/o appropriate incisional discomfort  Herve diet w/o nausea    VSS Afeb  Andrea breast skin islands--vioptix-upper 50's andrea, mastectomy skin clean with some ecchymosis noted, no collection appreciated  Abd-softly distected, no collection noted, closure intact    Drain's-all sero-sang output    A/P doing well POD#2 s/p ALESSANDRO flap breast reconstruction following andrea mastectomies  -Vioptix d/c'ed  -ambulate  -can d/c home later this afternoon after re-evaluation by resident  -all discharge instructions reviewed with pt--to f/u in office next week     Above d/w Dr. Zavala and Plastic Surgery resident

## 2021-12-22 NOTE — DISCHARGE NOTE PROVIDER - NSDCMRMEDTOKEN_GEN_ALL_CORE_FT
folic acid 1 mg oral tablet: 1 tab(s) orally once a day  Multiple Vitamins oral tablet: 1 tab(s) orally once a day  NexIUM 20 mg oral delayed release capsule: 1 cap(s) orally once a day, As Needed  Stelara 45 mg/0.5 mL subcutaneous solution: 90 milliliter(s) subcutaneous every 3 months  Wynzora 0.005%-0.064% topical cream: Apply topically to affected area once a day  Xanax 0.5 mg oral tablet: 1 tab(s) orally 3 times a day, As Needed

## 2021-12-22 NOTE — DISCHARGE NOTE PROVIDER - HOSPITAL COURSE
FZ underwent bilateral mastectomies and bilateral breast reconstruction with in the OR. The patient tolerated the procedure well. Postoperatively the patient was sent to the PACU. The patient remained in the PACU overnight. The patient's flaps were monitored by Vioptix and by clinical examination. On POD 1, the patient was hemodynamically stable; was transferred to a surgical floor; was advanced to a regular diet; was placed on her home medications; and was out of bed to a chair; the perry was removed and the patient voided appropriately. On POD 2, Vioptix monitors were removed; and the patient ambulated. During the patient's hospital course, the patient's pain was controlled by IV pain medications and then by PO pain medications.    At the time of discharge, the patient was hemodynamically stable, was tolerating PO diet, was voiding urine and passing stool, was ambulating, and was comfortable with adequate pain control. The patient was instructed to follow up with Dr. Zavala within x1 week(s) after discharge from the hospital. The patient/family felt comfortable with discharge. The patient received prescriptions for oral pain medication. The patient had no other issues.

## 2021-12-22 NOTE — DISCHARGE NOTE NURSING/CASE MANAGEMENT/SOCIAL WORK - PATIENT PORTAL LINK FT
You can access the FollowMyHealth Patient Portal offered by [facility] by registering at the following website: [email or web address]. By joining [facility]’s FollowMyHealth portal, you will also be able to view your health information using other applications (apps) compatible with our system.

## 2021-12-22 NOTE — DISCHARGE NOTE NURSING/CASE MANAGEMENT/SOCIAL WORK - NSDCPNINST_GEN_ALL_CORE
Instructed pt on s/s of infection, medication regimen, celso teaching and to call md f/u appt. pt verb understanding.

## 2021-12-22 NOTE — PROGRESS NOTE ADULT - SUBJECTIVE AND OBJECTIVE BOX
Plastic Surgery    SUBJECTIVE: Pt seen and examined on rounds with team. NAEON.      VITALS  T(C): 36.4 (12-22-21 @ 05:59), Max: 37.5 (12-21-21 @ 21:34)  HR: 74 (12-22-21 @ 05:59) (69 - 79)  BP: 103/69 (12-22-21 @ 05:59) (90/54 - 113/75)  RR: 18 (12-22-21 @ 05:59) (18 - 21)  SpO2: 94% (12-22-21 @ 05:59) (92% - 99%)  CAPILLARY BLOOD GLUCOSE          Is/Os    12-21 @ 07:01  -  12-22 @ 07:00  --------------------------------------------------------  IN:    Oral Fluid: 2290 mL  Total IN: 2290 mL    OUT:    Bulb (mL): 70 mL    Bulb (mL): 20 mL    Bulb (mL): 65 mL    Bulb (mL): 110 mL    Bulb (mL): 130 mL    Bulb (mL): 70 mL    Voided (mL): 2800 mL  Total OUT: 3265 mL    Total NET: -975 mL    PHYSICAL EXAM:  General: NAD, Lying in bed comfortably  Neuro: Awake and alert  Breast: L: soft, spotted ecchymosis throughout mastectomy flaps, no evidence of congestion or ischemia to flap, normal skin turgor, cap refill intact, incisions clean and dry. BENITEZ drain s/s, slightly more sang than serous.                 R:soft, spotted ecchymosis throughout mastectomy flaps, no evidence of congestion or ischemia to flap, normal skin turgor, cap refill intact, incisions clean and dry. BENITEZ drain s/s, slightly more sang than serous.   Extremity: MAS  GI/Abd: Soft, incision intact with prineo, clean and dry. JPs s/s NT/ND      MEDICATIONS (STANDING): acetaminophen     Tablet .. 975 milliGRAM(s) Oral every 8 hours  enoxaparin Injectable 40 milliGRAM(s) SubCutaneous daily  ibuprofen  Tablet. 400 milliGRAM(s) Oral every 6 hours  polyethylene glycol 3350 17 Gram(s) Oral daily  senna 2 Tablet(s) Oral at bedtime    MEDICATIONS (PRN):oxyCODONE    IR 5 milliGRAM(s) Oral every 4 hours PRN Moderate Pain (4 - 6)  oxyCODONE    IR 10 milliGRAM(s) Oral every 4 hours PRN Severe Pain (7 - 10)      LABS  CBC (12-22 @ 06:48)                              10.2<L>                         6.72    )----------------(  195        --    % Neutrophils, --    % Lymphocytes, ANC: --                                  31.3<L>    BMP (12-22 @ 06:48)             137     |  101     |  14    		Ca++ --      Ca 8.6                ---------------------------------( 107<H>		Mg 2.1                3.4<L>  |  25      |  0.71  			Ph 2.5         Coags (12-22 @ 06:48)  aPTT 27.6 / INR 0.90 / PT 10.9            IMAGING STUDIES

## 2021-12-22 NOTE — DISCHARGE NOTE PROVIDER - CARE PROVIDER_API CALL
Zeke Zavala)  Plastic Surgery; Surgery  [address], [address]  [place] [address]  Phone: [phone]  Fax: [phone]  Follow Up Time: 1 week

## 2021-12-22 NOTE — PROGRESS NOTE ADULT - ASSESSMENT
51y Female s/p b/l mastectomy and ALESSANDRO flap reconstruction, in SICU for q1 flap checks.    Plan:  - Pain control  - Monitor vital signs  - Care per primary team.       Green team   [id number]
Assessment: 51y Female s/p b/l mastectomy and ALESSANDRO flap reconstruction 12/20    Plan:  - Transition to PO pain meds  - OOBTC and walk with assistance this evening if tolerated  - Q2 flap checks  - Oxygen 2L/min NC  - PO diet, regular  - appreciate excellent care of SICU    Plastic Surgery (pg MATTHIEU: [id number], NS: [phone])  
Assessment: 51y Female s/p b/l mastectomy and ALESSANDRO flap reconstruction 12/20    Plan:  - encourage walking  - dispo: dc planning  - d/c vioptix today  - encourage increased PO  - PO pain medications  - BENITEZ drain maintenance      Plastic Surgery (pg MATTHIEU: [id number], NS: [phone]) 
51y Female s/p b/l mastectomy and ALESSANDRO flap reconstruction, in SICU for q1 flap checks.    Plan:  - Pain control  - Monitor vital signs  - Care per SICU.       Green team   [id number]

## 2021-12-22 NOTE — DISCHARGE NOTE PROVIDER - NSDCCPCAREPLAN_GEN_ALL_CORE_FT
PRINCIPAL DISCHARGE DIAGNOSIS  Diagnosis: Bilateral malignant neoplasm of breast in female  Assessment and Plan of Treatment:       SECONDARY DISCHARGE DIAGNOSES  Diagnosis: Bilateral malignant neoplasm of breast in female  Assessment and Plan of Treatment:

## 2021-12-22 NOTE — DISCHARGE NOTE PROVIDER - NSDCFUADDINST_GEN_ALL_CORE_FT
Activity:  - Rest at home during the first few days after surgery.  - Walking is encouraged, but strenuous exercise is not allowed until 6 weeks after surgery.   - Avoid strenuous activity. Do not lift your arms above your head. Do not lift more than 5-10  pounds.    Sleep:  Sleep on your back for the first two weeks after surgery.    Showering:  - You may take sponge baths following discharge. Pat dry. We will instruct you to shower once you  have drains removed from your breasts in the office.  - Do not take a bath or submerge yourself in water.  - You will have special adhesive glue or tape over the incisions. Do not take these off.  For the Breast:  You have just undergone a breast reconstruction with the ALESSANDRO flap. Your breast will likely have bruising  and possibly some blistering on the skin, which is expected after a mastectomy. You have a small patch  of skin on your breasts, which is a different color than the surrounding breast skin. This paddle of skin  comes from the abdomen and is an indicator of how the flap is doing. It is important to check this skin  paddle daily. The skin should remain the same color. If the color of the small patch changes (i.e blue,  purple, pale), please call the office immediately.    For the Abdomen:  Your incision and belly button are covered in a special medical grade sealant, which will come off in the  office, 2-3 weeks after surgery.    Drains:  Both the breast and abdomen will have drains. It is important to empty the drains twice daily and  record the outputs. Please bring this sheet to your appointment after surgery. Based on the output, the  drains will be removed 1 to 3 weeks after surgery. For the drains to be working appropriately, the bulbs  need to be collapsed to create a light suction. The nurse in the hospital will review the drain care with  you and your family prior to discharge home. It is best to safely secure the drains to your clothes with a  safety pin.      Call the Office:  Do not hesitate to call the office with any concerns or questions. A doctor is available to answer your  questions 24 hours a day. Please notify us immediately at if:  1. You have increased swelling, pain, or color change in the breast.  2. One breast becomes suddenly significantly larger than the other breast.  3. You have a sudden increase in swelling of the abdomen.  4. You have redness develop around the incisions.  5. You have a fever greater than 101 F.  6. You develop sudden increase in pain.  7. You develop drainage, spreading redness or foul odor  8. You have any questions.

## 2021-12-22 NOTE — DISCHARGE NOTE NURSING/CASE MANAGEMENT/SOCIAL WORK - NSDCPEFALRISK_GEN_ALL_CORE
For information on Fall & Injury Prevention, visit: [email or web address] OR  [email or web address] OR  https://www.cdc.gov/steadi/patient.html

## 2021-12-22 NOTE — PROGRESS NOTE ADULT - SUBJECTIVE AND OBJECTIVE BOX
TEAM B Surgery Progress Note    INTERVAL EVENTS:   No acute events overnight.    SUBJECTIVE: Patient seen and examined at bedside with surgical team, patient without complaints. Denies fever, chills, CP, SOB nausea, vomiting. Pain well controlled.     OBJECTIVE:    Vital Signs Last 24 Hrs  T(C): 36.8 (22 Dec 2021 01:16), Max: 37.5 (21 Dec 2021 21:34)  T(F): 98.3 (22 Dec 2021 01:16), Max: 99.5 (21 Dec 2021 21:34)  HR: 69 (22 Dec 2021 01:16) (68 - 79)  BP: 102/65 (22 Dec 2021 01:16) (84/54 - 113/75)  BP(mean): 74 (21 Dec 2021 16:00) (64 - 77)  RR: 18 (22 Dec 2021 01:16) (16 - 32)  SpO2: 95% (22 Dec 2021 01:16) (92% - 99%)      I&O's Detail    20 Dec 2021 07:01  -  21 Dec 2021 07:00  --------------------------------------------------------  IN:    IV PiggyBack: 300 mL    Lactated Ringers: 1690 mL    Oral Fluid: 165 mL  Total IN: 2155 mL    OUT:    Bulb (mL): 60 mL    Bulb (mL): 65 mL    Bulb (mL): 50 mL    Bulb (mL): 40 mL    Bulb (mL): 55 mL    Bulb (mL): 30 mL    Indwelling Catheter - Urethral (mL): 1385 mL  Total OUT: 1685 mL    Total NET: 470 mL      21 Dec 2021 07:01  -  22 Dec 2021 04:24  --------------------------------------------------------  IN:    Oral Fluid: 1990 mL  Total IN: 1990 mL    OUT:    Bulb (mL): 35 mL    Bulb (mL): 70 mL    Bulb (mL): 80 mL    Bulb (mL): 15 mL    Bulb (mL): 50 mL    Bulb (mL): 50 mL    Voided (mL): 2250 mL  Total OUT: 2550 mL    Total NET: -560 mL    MEDICATIONS  (STANDING):  acetaminophen     Tablet .. 975 milliGRAM(s) Oral every 8 hours  acetaminophen   IVPB .. 1000 milliGRAM(s) IV Intermittent every 8 hours  ceFAZolin   IVPB 2000 milliGRAM(s) IV Intermittent every 8 hours  heparin   Injectable 5000 Unit(s) SubCutaneous every 12 hours  ketorolac 10 milliGRAM(s) Oral every 6 hours  ketorolac   Injectable 15 milliGRAM(s) IV Push every 6 hours  lactated ringers. 1000 milliLiter(s) (125 mL/Hr) IV Continuous <Continuous>    MEDICATIONS  (PRN):  oxyCODONE    IR 5 milliGRAM(s) Oral every 4 hours PRN Moderate Pain (4 - 6)  oxyCODONE    IR 10 milliGRAM(s) Oral every 4 hours PRN Severe Pain (7 - 10)      PHYSICAL EXAM:    Gen: NAD  Resp: breathing easily, no stridor  Chets: periincisional tenderness, SS drain output, incisions c/d/i, Non-distended.  CV: RRR  Abdomen: Abdomen soft.    LABS:                        11.3   13.77 )-----------( 228      ( 20 Dec 2021 18:42 )             33.6     12-20    140  |  104  |  9   ----------------------------<  173<H>  4.0   |  23  |  0.59    Ca    8.0<L>      20 Dec 2021 18:42                 Green TEAM  Surgery Progress Note    INTERVAL EVENTS:   No acute events overnight.    SUBJECTIVE: Patient seen and examined at bedside with surgical team, patient without complaints. Denies fever, chills, CP, SOB nausea, vomiting. Pain well controlled.     OBJECTIVE:    Vital Signs Last 24 Hrs  T(C): 36.8 (22 Dec 2021 01:16), Max: 37.5 (21 Dec 2021 21:34)  T(F): 98.3 (22 Dec 2021 01:16), Max: 99.5 (21 Dec 2021 21:34)  HR: 69 (22 Dec 2021 01:16) (68 - 79)  BP: 102/65 (22 Dec 2021 01:16) (84/54 - 113/75)  BP(mean): 74 (21 Dec 2021 16:00) (64 - 77)  RR: 18 (22 Dec 2021 01:16) (16 - 32)  SpO2: 95% (22 Dec 2021 01:16) (92% - 99%)      I&O's Detail    20 Dec 2021 07:01  -  21 Dec 2021 07:00  --------------------------------------------------------  IN:    IV PiggyBack: 300 mL    Lactated Ringers: 1690 mL    Oral Fluid: 165 mL  Total IN: 2155 mL    OUT:    Bulb (mL): 60 mL    Bulb (mL): 65 mL    Bulb (mL): 50 mL    Bulb (mL): 40 mL    Bulb (mL): 55 mL    Bulb (mL): 30 mL    Indwelling Catheter - Urethral (mL): 1385 mL  Total OUT: 1685 mL    Total NET: 470 mL      21 Dec 2021 07:01  -  22 Dec 2021 04:24  --------------------------------------------------------  IN:    Oral Fluid: 1990 mL  Total IN: 1990 mL    OUT:    Bulb (mL): 35 mL    Bulb (mL): 70 mL    Bulb (mL): 80 mL    Bulb (mL): 15 mL    Bulb (mL): 50 mL    Bulb (mL): 50 mL    Voided (mL): 2250 mL  Total OUT: 2550 mL    Total NET: -560 mL    MEDICATIONS  (STANDING):  acetaminophen     Tablet .. 975 milliGRAM(s) Oral every 8 hours  acetaminophen   IVPB .. 1000 milliGRAM(s) IV Intermittent every 8 hours  ceFAZolin   IVPB 2000 milliGRAM(s) IV Intermittent every 8 hours  heparin   Injectable 5000 Unit(s) SubCutaneous every 12 hours  ketorolac 10 milliGRAM(s) Oral every 6 hours  ketorolac   Injectable 15 milliGRAM(s) IV Push every 6 hours  lactated ringers. 1000 milliLiter(s) (125 mL/Hr) IV Continuous <Continuous>    MEDICATIONS  (PRN):  oxyCODONE    IR 5 milliGRAM(s) Oral every 4 hours PRN Moderate Pain (4 - 6)  oxyCODONE    IR 10 milliGRAM(s) Oral every 4 hours PRN Severe Pain (7 - 10)      PHYSICAL EXAM:    Gen: NAD  Resp: breathing easily, no stridor  Chets: periincisional tenderness, SS drain output, incisions c/d/i, Non-distended.  CV: RRR  Abdomen: Abdomen soft.    LABS:                        11.3   13.77 )-----------( 228      ( 20 Dec 2021 18:42 )             33.6     12-20    140  |  104  |  9   ----------------------------<  173<H>  4.0   |  23  |  0.59    Ca    8.0<L>      20 Dec 2021 18:42                 Green TEAM  Surgery Progress Note    INTERVAL EVENTS:   No acute events overnight.    SUBJECTIVE: Patient seen and examined at bedside with surgical team, patient without complaints. Denies fever, chills, CP, SOB nausea, vomiting. Pain well controlled.     OBJECTIVE:    Vital Signs Last 24 Hrs  T(C): 36.8 (22 Dec 2021 01:16), Max: 37.5 (21 Dec 2021 21:34)  T(F): 98.3 (22 Dec 2021 01:16), Max: 99.5 (21 Dec 2021 21:34)  HR: 69 (22 Dec 2021 01:16) (68 - 79)  BP: 102/65 (22 Dec 2021 01:16) (84/54 - 113/75)  BP(mean): 74 (21 Dec 2021 16:00) (64 - 77)  RR: 18 (22 Dec 2021 01:16) (16 - 32)  SpO2: 95% (22 Dec 2021 01:16) (92% - 99%)      I&O's Detail    20 Dec 2021 07:01  -  21 Dec 2021 07:00  --------------------------------------------------------  IN:    IV PiggyBack: 300 mL    Lactated Ringers: 1690 mL    Oral Fluid: 165 mL  Total IN: 2155 mL    OUT:    Bulb (mL): 60 mL    Bulb (mL): 65 mL    Bulb (mL): 50 mL    Bulb (mL): 40 mL    Bulb (mL): 55 mL    Bulb (mL): 30 mL    Indwelling Catheter - Urethral (mL): 1385 mL  Total OUT: 1685 mL    Total NET: 470 mL      21 Dec 2021 07:01  -  22 Dec 2021 04:24  --------------------------------------------------------  IN:    Oral Fluid: 1990 mL  Total IN: 1990 mL    OUT:    Bulb (mL): 35 mL    Bulb (mL): 70 mL    Bulb (mL): 80 mL    Bulb (mL): 15 mL    Bulb (mL): 50 mL    Bulb (mL): 50 mL    Voided (mL): 2250 mL  Total OUT: 2550 mL    Total NET: -560 mL    MEDICATIONS  (STANDING):  acetaminophen     Tablet .. 975 milliGRAM(s) Oral every 8 hours  acetaminophen   IVPB .. 1000 milliGRAM(s) IV Intermittent every 8 hours  ceFAZolin   IVPB 2000 milliGRAM(s) IV Intermittent every 8 hours  heparin   Injectable 5000 Unit(s) SubCutaneous every 12 hours  ketorolac 10 milliGRAM(s) Oral every 6 hours  ketorolac   Injectable 15 milliGRAM(s) IV Push every 6 hours  lactated ringers. 1000 milliLiter(s) (125 mL/Hr) IV Continuous <Continuous>    MEDICATIONS  (PRN):  oxyCODONE    IR 5 milliGRAM(s) Oral every 4 hours PRN Moderate Pain (4 - 6)  oxyCODONE    IR 10 milliGRAM(s) Oral every 4 hours PRN Severe Pain (7 - 10)      PHYSICAL EXAM:    Gen: NAD  Resp: breathing easily, no stridor  Chets: periincisional tenderness, SS drain output, incisions c/d/i, Non-distended.  CV: RRR  Abdomen: Abdomen soft.modereatley tender     LABS:                        11.3   13.77 )-----------( 228      ( 20 Dec 2021 18:42 )             33.6     12-20    140  |  104  |  9   ----------------------------<  173<H>  4.0   |  23  |  0.59    Ca    8.0<L>      20 Dec 2021 18:42

## 2021-12-29 LAB — SURGICAL PATHOLOGY STUDY: SIGNIFICANT CHANGE UP

## 2022-01-05 NOTE — ASU DISCHARGE PLAN (ADULT/PEDIATRIC) - CALL YOUR DOCTOR IF YOU HAVE ANY OF THE FOLLOWING:
89 year old male with HFrEF, bioprosthestic MV, on AC, PPM, DM on insulin, CKD, Alzheimer's dementia, BIBEMS with syncope x 2 within 24 hours.  Admitted to PCU under medicine to establish care goals and disposition planning.    
Bleeding that does not stop/Swelling that gets worse/Pain not relieved by Medications
 89 year old male with HFrEF, bioprosthestic MV, on AC, PPM, DM on insulin, CKD, Alzheimer's dementia, BIBEMS with syncope x 2 within 24 hours.  Admitted to PCU under medicine to establish care goals and disposition planning.    

## 2022-01-27 ENCOUNTER — APPOINTMENT (OUTPATIENT)
Dept: GYNECOLOGIC ONCOLOGY | Facility: CLINIC | Age: 52
End: 2022-01-27
Payer: COMMERCIAL

## 2022-01-27 VITALS — SYSTOLIC BLOOD PRESSURE: 138 MMHG | DIASTOLIC BLOOD PRESSURE: 80 MMHG

## 2022-01-27 PROCEDURE — 99214 OFFICE O/P EST MOD 30 MIN: CPT

## 2022-01-31 NOTE — DISCUSSION/SUMMARY
[Reviewed Clinical Lab Test(s)] : Results of clinical tests were reviewed. [Reviewed Radiology Report(s)] : Radiology reports were reviewed. [Reviewed Radiology Film/Image(s)] : Images from radiology studies were reviewed and examined. [Discuss Alternatives/Risks/Benefits w/Patient] : All alternatives, risks, and benefits were discussed with the patient/family and all questions were answered.  Patient expressed good understanding and appreciates the importance of follow up as recommended. [Pre Op] : The differential diagnosis was discussed in detail. The indications, risks, benefits and alternatives were discussed. [unfilled] expressed an understanding of the treatment rationale and her questions were answered to her apparent satisfaction.

## 2022-01-31 NOTE — HISTORY OF PRESENT ILLNESS
[FreeTextEntry1] : 52 y/o  via  x 2 female, LMP 1 week ago being referred by Dr. Sal for surgical consultation. Patient with newly diagnosed breast cancer she is s/p lumpectomy with reduction on  and was told she has a stage 1 cancer. She reports that when they did the reduction procedure her right breast came back with cancer cells so bilateral mastectomy with DIEPFLAP recommendation. Patient is scheduled this upcoming Monday on 21 with Dr. Palleschi and Dr. Zavala. She reports over the past 1 year she has had increase in her vaginal bleeding with heavy clotting which prompted work up with EMB. 21 EMB: at least complex endometrial hyperplasia with atypia. Denies vaginal discharge, abdominal pain/bloating/distension, pelvis discomfort, changes in normal bowel habits, nausea/vomiting, unintentional weight loss/gain, and all other associated signs and symptoms. She admits to increase urinary frequency and urgency which has acutely been worsening over the past few years. Patient reports she discussed case with breast and plastic surgeon and they do not recommend a combined procedure due to increased infection rate due to cross contamination. She is currently sexually active, denies pain and/or bleeding.  \par \par She reports she is BRCA negative. She will need Tamoxifen post surgical procedure. \par \par Health Screening:\par Last Pap: 2021 normal \par Last Colonoscopy: 2021; normal as per patient

## 2022-01-31 NOTE — ASSESSMENT
[FreeTextEntry1] : Patient is healing well from surgery aside from obvious abdominal discomfort. After seeing her oncologist, she was advised that aromatase inhibitor would be best going forward after completion of surgery with me. Advised the patient that I am not comfortable pursuing surgery while she has an active infection - will wait until after recovery period to schedule procedure. I will coordinate dates with Dr. Zeke Zavala who cleared the patient for surgery starting 2/21/22 following her recovery period. \par \par Patient had an umbilical hernia repaired during her recent procedure - per patient, she no longer has a belly button. \par \par I discussed at length with the patient the nature, purpose, risks, benefits, and alternatives of Robot assisted total laparoscopic hysterectomy with bilateral salpingo-oophorectomy, sentinel lymph node mapping with node biopsies.  The patient understands the risks to include (but not be limited to) bowel injury, bleeding (with the possible need for transfusion), bladder or ureteral injury, infections, deep venous thrombosis, and cong-operative death.  The patient also understands that her surgery may not be able to be performed robotically and that she may need a laparotomy.  She also understands the limitations of robotic surgery and the possibility of missing a surgical complication with need for subsequent re-exploration.  She agrees to proceed.  She asked numerous questions which were answered to her satisfaction.  She understands the need for a clear liquid diet the day before her procedure and agrees to comply with our instructions.  She also understands the rationale for a cystoscopy at the completion of the procedure and the potential risks of cystoscopy.

## 2022-01-31 NOTE — CHIEF COMPLAINT
[FreeTextEntry1] : [facility]\par \par [facility] [phone] at [address] [place] [id number] \par \par at least complex endometrial hyperplasia with atypia

## 2022-01-31 NOTE — REASON FOR VISIT
[FreeTextEntry1] : [place] Location\par \par [facility] [phone] at [address] [place] [id number]\par  \par at least complex endometrial hyperplasia with atypia

## 2022-01-31 NOTE — ASSESSMENT
[FreeTextEntry1] : 52 y/o female with newly diagnosed breast ca undergoing bilateral mastectomy with ALESSANDRO FLAP on this upcoming monday at [facility] with breast surgeon and plastic surgeon, with EMB significant for at least complex hyperplasia with atypia. I discussed with the patient my recommendations of a robotic assisted total laparoscopic hysterectomy, bilateral salpingectomy (+/- oophorectomy) possible SLND, possible cystoscopy. I recommended this to be done 6-8 weeks post op from upcoming procedure to allow adequate healing time - and I agree with risk of doing combined procedure at the time of breast surgery. I discussed in detail with the patient risk of recurrent anesthesia inductions as well as rationale behind why hysterectomy is warranted. I also discussed ~ 42% risk the possibility of an early malignancy being present. I discussed option of bilateral oophorectomy with the patient given her negative genetic testing and risk of surgical menopause including cardiac, bone and cognitive impairment. She prefers ovaries to be removed to eliminate source of future malignancy. I discussed possible recommendation for urogynecology referral but patient does not wish to proceed with that work up at this time.

## 2022-01-31 NOTE — END OF VISIT
[FreeTextEntry3] : This note accurately reflects the work and decisions made by me.\par Written by Loli Oakley PA-C acting as a scribe for Dr. Crystal Carrion.

## 2022-01-31 NOTE — HISTORY OF PRESENT ILLNESS
[FreeTextEntry1] : 50 y/o patient with newly diagnosed breast cancer. She is s/p lumpectomy with reduction on 9/27 and was told she has a stage 1 cancer. Per patient, following the reduction procedure her right breast came back with cancer cells so bilateral mastectomy with DIEPFLAP was recommended. Patient was scheduled on 12/20/21 with Dr. Palleschi and Dr. Zavala. She was initially seen on 12/15/21 when she reported increase vaginal bleeding with heavy clotting over the past year which prompted work up with EMB. 11/29/21 EMB: at least complex endometrial hyperplasia with atypia. During her consultation visit, we discussed my recommendation for a total laparoscopic hysterectomy, bilateral salpingectomy (+/- oophorectomy), possible SLND, possible cystoscopy. I advised the patient to give herself some time to heal from her bilateral mastectomy before the hysterectomy. She presents today for surgical planning following her breast procedure. Today she reports hot flashes and night sweats, consistent with her postsurgical status. She tolerated her procedure well, no additional therapy was warranted following her surgery. She f/u with her oncologist/Dr. Iqra Gates yesterday. \par \par She f/u with Dr. Zavala yesterday for abdominal pain - she has a slight infection on her incision for which she has antibiotics. She is insistent on changing her own dressings and denied home care.

## 2022-01-31 NOTE — PHYSICAL EXAM
[Chaperone Present] : A chaperone was present in the examining room during all aspects of the physical examination [Abnormal] : Examination of vagina: Abnormal [Normal] : Recto-Vaginal Exam: Normal [FreeTextEntry1] : Loli Oakley PA-C [de-identified] : mild paravaginal defect noted

## 2022-02-18 NOTE — DISCHARGE NOTE PROVIDER - DISCHARGE DIET
Goals of Care and Symptom Management history of pancreatic cancer on chemo Regular Diet - No restrictions

## 2022-03-10 ENCOUNTER — OUTPATIENT (OUTPATIENT)
Dept: OUTPATIENT SERVICES | Facility: HOSPITAL | Age: 52
LOS: 1 days | End: 2022-03-10
Payer: COMMERCIAL

## 2022-03-10 VITALS
TEMPERATURE: 98 F | SYSTOLIC BLOOD PRESSURE: 120 MMHG | WEIGHT: 200.18 LBS | HEIGHT: 65 IN | HEART RATE: 90 BPM | OXYGEN SATURATION: 96 % | RESPIRATION RATE: 20 BRPM | DIASTOLIC BLOOD PRESSURE: 80 MMHG

## 2022-03-10 DIAGNOSIS — Z01.818 ENCOUNTER FOR OTHER PREPROCEDURAL EXAMINATION: ICD-10-CM

## 2022-03-10 DIAGNOSIS — Z98.890 OTHER SPECIFIED POSTPROCEDURAL STATES: Chronic | ICD-10-CM

## 2022-03-10 DIAGNOSIS — Z90.13 ACQUIRED ABSENCE OF BILATERAL BREASTS AND NIPPLES: Chronic | ICD-10-CM

## 2022-03-10 DIAGNOSIS — N85.02 ENDOMETRIAL INTRAEPITHELIAL NEOPLASIA [EIN]: ICD-10-CM

## 2022-03-10 DIAGNOSIS — G47.33 OBSTRUCTIVE SLEEP APNEA (ADULT) (PEDIATRIC): ICD-10-CM

## 2022-03-10 DIAGNOSIS — Z98.51 TUBAL LIGATION STATUS: Chronic | ICD-10-CM

## 2022-03-10 DIAGNOSIS — Z91.89 OTHER SPECIFIED PERSONAL RISK FACTORS, NOT ELSEWHERE CLASSIFIED: ICD-10-CM

## 2022-03-10 LAB
A1C WITH ESTIMATED AVERAGE GLUCOSE RESULT: 5.4 % — SIGNIFICANT CHANGE UP (ref 4–5.6)
ALBUMIN SERPL ELPH-MCNC: 4.4 G/DL — SIGNIFICANT CHANGE UP (ref 3.3–5.2)
ALP SERPL-CCNC: 95 U/L — SIGNIFICANT CHANGE UP (ref 40–120)
ALT FLD-CCNC: 19 U/L — SIGNIFICANT CHANGE UP
ANION GAP SERPL CALC-SCNC: 12 MMOL/L — SIGNIFICANT CHANGE UP (ref 5–17)
APTT BLD: 31.4 SEC — SIGNIFICANT CHANGE UP (ref 27.5–35.5)
AST SERPL-CCNC: 14 U/L — SIGNIFICANT CHANGE UP
BASOPHILS # BLD AUTO: 0.07 K/UL — SIGNIFICANT CHANGE UP (ref 0–0.2)
BASOPHILS NFR BLD AUTO: 0.8 % — SIGNIFICANT CHANGE UP (ref 0–2)
BILIRUB SERPL-MCNC: 0.3 MG/DL — LOW (ref 0.4–2)
BLD GP AB SCN SERPL QL: SIGNIFICANT CHANGE UP
BUN SERPL-MCNC: 18.5 MG/DL — SIGNIFICANT CHANGE UP (ref 8–20)
CALCIUM SERPL-MCNC: 9.7 MG/DL — SIGNIFICANT CHANGE UP (ref 8.6–10.2)
CHLORIDE SERPL-SCNC: 103 MMOL/L — SIGNIFICANT CHANGE UP (ref 98–107)
CO2 SERPL-SCNC: 26 MMOL/L — SIGNIFICANT CHANGE UP (ref 22–29)
CREAT SERPL-MCNC: 0.67 MG/DL — SIGNIFICANT CHANGE UP (ref 0.5–1.3)
EGFR: 106 ML/MIN/1.73M2 — SIGNIFICANT CHANGE UP
EOSINOPHIL # BLD AUTO: 0.21 K/UL — SIGNIFICANT CHANGE UP (ref 0–0.5)
EOSINOPHIL NFR BLD AUTO: 2.3 % — SIGNIFICANT CHANGE UP (ref 0–6)
ESTIMATED AVERAGE GLUCOSE: 108 MG/DL — SIGNIFICANT CHANGE UP (ref 68–114)
GLUCOSE SERPL-MCNC: 108 MG/DL — HIGH (ref 70–99)
HCG SERPL-ACNC: <4 MIU/ML — SIGNIFICANT CHANGE UP
HCT VFR BLD CALC: 40.4 % — SIGNIFICANT CHANGE UP (ref 34.5–45)
HGB BLD-MCNC: 13.1 G/DL — SIGNIFICANT CHANGE UP (ref 11.5–15.5)
IMM GRANULOCYTES NFR BLD AUTO: 0.1 % — SIGNIFICANT CHANGE UP (ref 0–1.5)
INR BLD: 0.98 RATIO — SIGNIFICANT CHANGE UP (ref 0.88–1.16)
LYMPHOCYTES # BLD AUTO: 2.23 K/UL — SIGNIFICANT CHANGE UP (ref 1–3.3)
LYMPHOCYTES # BLD AUTO: 24.7 % — SIGNIFICANT CHANGE UP (ref 13–44)
MAGNESIUM SERPL-MCNC: 2 MG/DL — SIGNIFICANT CHANGE UP (ref 1.8–2.6)
MCHC RBC-ENTMCNC: 29.1 PG — SIGNIFICANT CHANGE UP (ref 27–34)
MCHC RBC-ENTMCNC: 32.4 GM/DL — SIGNIFICANT CHANGE UP (ref 32–36)
MCV RBC AUTO: 89.8 FL — SIGNIFICANT CHANGE UP (ref 80–100)
MONOCYTES # BLD AUTO: 0.42 K/UL — SIGNIFICANT CHANGE UP (ref 0–0.9)
MONOCYTES NFR BLD AUTO: 4.7 % — SIGNIFICANT CHANGE UP (ref 2–14)
NEUTROPHILS # BLD AUTO: 6.09 K/UL — SIGNIFICANT CHANGE UP (ref 1.8–7.4)
NEUTROPHILS NFR BLD AUTO: 67.4 % — SIGNIFICANT CHANGE UP (ref 43–77)
PHOSPHATE SERPL-MCNC: 3.5 MG/DL — SIGNIFICANT CHANGE UP (ref 2.4–4.7)
PLATELET # BLD AUTO: 295 K/UL — SIGNIFICANT CHANGE UP (ref 150–400)
POTASSIUM SERPL-MCNC: 4.5 MMOL/L — SIGNIFICANT CHANGE UP (ref 3.5–5.3)
POTASSIUM SERPL-SCNC: 4.5 MMOL/L — SIGNIFICANT CHANGE UP (ref 3.5–5.3)
PROT SERPL-MCNC: 7.3 G/DL — SIGNIFICANT CHANGE UP (ref 6.6–8.7)
PROTHROM AB SERPL-ACNC: 11.4 SEC — SIGNIFICANT CHANGE UP (ref 10.5–13.4)
RBC # BLD: 4.5 M/UL — SIGNIFICANT CHANGE UP (ref 3.8–5.2)
RBC # FLD: 14.9 % — HIGH (ref 10.3–14.5)
SODIUM SERPL-SCNC: 141 MMOL/L — SIGNIFICANT CHANGE UP (ref 135–145)
WBC # BLD: 9.03 K/UL — SIGNIFICANT CHANGE UP (ref 3.8–10.5)
WBC # FLD AUTO: 9.03 K/UL — SIGNIFICANT CHANGE UP (ref 3.8–10.5)

## 2022-03-10 PROCEDURE — 93005 ELECTROCARDIOGRAM TRACING: CPT

## 2022-03-10 PROCEDURE — 93010 ELECTROCARDIOGRAM REPORT: CPT

## 2022-03-10 PROCEDURE — G0463: CPT

## 2022-03-10 RX ORDER — SODIUM CHLORIDE 9 MG/ML
3 INJECTION INTRAMUSCULAR; INTRAVENOUS; SUBCUTANEOUS EVERY 8 HOURS
Refills: 0 | Status: DISCONTINUED | OUTPATIENT
Start: 2022-03-18 | End: 2022-03-18

## 2022-03-10 RX ORDER — CALCIPOTRIENE AND BETAMETHASONE DIPROPIONATE 50; .5 UG/G; MG/G
1 OINTMENT TOPICAL
Qty: 0 | Refills: 0 | DISCHARGE

## 2022-03-10 RX ORDER — CEFAZOLIN SODIUM 1 G
2000 VIAL (EA) INJECTION ONCE
Refills: 0 | Status: COMPLETED | OUTPATIENT
Start: 2022-03-18 | End: 2022-03-18

## 2022-03-10 RX ORDER — METRONIDAZOLE 500 MG
500 TABLET ORAL ONCE
Refills: 0 | Status: COMPLETED | OUTPATIENT
Start: 2022-03-18 | End: 2022-03-18

## 2022-03-10 NOTE — H&P PST ADULT - OTHER CARE PROVIDERS
Amy [phone] PCP/ [facility] cardiac [phone]   [facility] oncology [phone] Amy [phone] PCP

## 2022-03-10 NOTE — H&P PST ADULT - ASSESSMENT
Patient educated on surgical scrub, ERP protocol, COVID testing scheduled for 3/15/22, preadmission instructions, medical clearance and day of procedure medications, verbalizes understanding. Pt instructed to stop vitamins/supplements/herbal medications/ASA/NSAIDS for one week prior to surgery and discuss with PMD.    CAPRINI SCORE [CLOT]    AGE RELATED RISK FACTORS                                                       MOBILITY RELATED FACTORS  [ ] Age 41-60 years                                            (1 Point)                  [ ] Bed rest                                                        (1 Point)  [ ] Age: 61-74 years                                           (2 Points)                 [ ] Plaster cast                                                   (2 Points)  [ ] Age= 75 years                                              (3 Points)                 [ ] Bed bound for more than 72 hours                 (2 Points)    DISEASE RELATED RISK FACTORS                                               GENDER SPECIFIC FACTORS  [ ] Edema in the lower extremities                       (1 Point)                  [ ] Pregnancy                                                     (1 Point)  [ ] Varicose veins                                               (1 Point)                  [ ] Post-partum < 6 weeks                                   (1 Point)             [ ] BMI > 25 Kg/m2                                            (1 Point)                  [ ] Hormonal therapy  or oral contraception          (1 Point)                 [ ] Sepsis (in the previous month)                        (1 Point)                  [ ] History of pregnancy complications                 (1 point)  [ ] Pneumonia or serious lung disease                                               [ ] Unexplained or recurrent                     (1 Point)           (in the previous month)                               (1 Point)  [ ] Abnormal pulmonary function test                     (1 Point)                 SURGERY RELATED RISK FACTORS  [ ] Acute myocardial infarction                              (1 Point)                 [ ]  Section                                             (1 Point)  [ ] Congestive heart failure (in the previous month)  (1 Point)               [ ] Minor surgery                                                  (1 Point)   [ ] Inflammatory bowel disease                             (1 Point)                 [ ] Arthroscopic surgery                                        (2 Points)  [ ] Central venous access                                      (2 Points)                [ ] General surgery lasting more than 45 minutes   (2 Points)       [ ] Stroke (in the previous month)                          (5 Points)               [ ] Elective arthroplasty                                         (5 Points)                                                                                                                                               HEMATOLOGY RELATED FACTORS                                                 TRAUMA RELATED RISK FACTORS  [ ] Prior episodes of VTE                                     (3 Points)                [ ] Fracture of the hip, pelvis, or leg                       (5 Points)  [ ] Positive family history for VTE                         (3 Points)                 [ ] Acute spinal cord injury (in the previous month)  (5 Points)  [ ] Prothrombin 75364 A                                     (3 Points)                 [ ] Paralysis  (less than 1 month)                             (5 Points)  [ ] Factor V Leiden                                             (3 Points)                  [ ] Multiple Trauma within 1 month                        (5 Points)  [ ] Lupus anticoagulants                                     (3 Points)                                                           [ ] Anticardiolipin antibodies                               (3 Points)                                                       [ ] High homocysteine in the blood                      (3 Points)                                             [ ] Other congenital or acquired thrombophilia      (3 Points)                                                [ ] Heparin induced thrombocytopenia                  (3 Points)                                          Total Score [          ]    Caprini Score 0 - 2:  Low Risk, No VTE Prophylaxis required for most patients, encourage ambulation  Caprini Score 3 - 6:  At Risk, pharmacologic VTE prophylaxis is indicated for most patients (in the absence of a contraindication)  Caprini Score Greater than or = 7:  High Risk, pharmacologic VTE prophylaxis is indicated for most patients (in the absence of a contraindication)    OPIOID RISK TOOL    NIDA EACH BOX THAT APPLIES AND ADD TOTALS AT THE END    FAMILY HISTORY OF SUBSTANCE ABUSE                 FEMALE         MALE                                                Alcohol                             [  ]1 pt          [  ]3pts                                               Illegal Durgs                     [  ]2 pts        [  ]3pts                                               Rx Drugs                           [  ]4 pts        [  ]4 pts    PERSONAL HISTORY OF SUBSTANCE ABUSE                                                                                          Alcohol                             [  ]3 pts       [  ]3 pts                                               Illegal Drugs                     [  ]4 pts        [  ]4 pts                                               Rx Drugs                           [  ]5 pts        [  ]5 pts    AGE BETWEEN 16-45 YEARS                                      [  ]1 pt         [  ]1 pt    HISTORY OF PREADOLESCENT   SEXUAL ABUSE                                                             [  ]3 pts        [  ]0pts    PSYCHOLOGICAL DISEASE                     ADD, OCD, Bipolar, Schizophrenia        [  ]2 pts         [  ]2 pts                      Depression                                               [  ]1 pt           [  ]1 pt           SCORING TOTAL   (add numbers and type here)              (***)                                     A score of 3 or lower indicated LOW risk for future opioid abuse  A score of 4 to 7 indicated moderate risk for future opioid abuse  A score of 8 or higher indicates a high risk for opioid abuse           Patient educated on surgical scrub, ERP protocol, COVID testing scheduled for 3/15/22, preadmission instructions, medical clearance and day of procedure medications, verbalizes understanding. Pt instructed to stop vitamins/supplements/herbal medications/ASA/NSAIDS for one week prior to surgery and discuss with PMD.    CAPRINI SCORE [CLOT]    AGE RELATED RISK FACTORS                                                       MOBILITY RELATED FACTORS  [ x] Age 41-60 years                                            (1 Point)                  [ ] Bed rest                                                        (1 Point)  [ ] Age: 61-74 years                                           (2 Points)                 [ ] Plaster cast                                                   (2 Points)  [ ] Age= 75 years                                              (3 Points)                 [ ] Bed bound for more than 72 hours                 (2 Points)    DISEASE RELATED RISK FACTORS                                               GENDER SPECIFIC FACTORS  [ ] Edema in the lower extremities                       (1 Point)                  [ ] Pregnancy                                                     (1 Point)  [ ] Varicose veins                                               (1 Point)                  [ ] Post-partum < 6 weeks                                   (1 Point)             [x ] BMI > 25 Kg/m2                                            (1 Point)                  [ ] Hormonal therapy  or oral contraception          (1 Point)                 [ ] Sepsis (in the previous month)                        (1 Point)                  [ ] History of pregnancy complications                 (1 point)  [ ] Pneumonia or serious lung disease                                               [ ] Unexplained or recurrent                     (1 Point)           (in the previous month)                               (1 Point)  [ ] Abnormal pulmonary function test                     (1 Point)                 SURGERY RELATED RISK FACTORS  [ ] Acute myocardial infarction                              (1 Point)                 [ ]  Section                                             (1 Point)  [ ] Congestive heart failure (in the previous month)  (1 Point)               [ ] Minor surgery                                                  (1 Point)   [ ] Inflammatory bowel disease                             (1 Point)                 [ ] Arthroscopic surgery                                        (2 Points)  [ ] Central venous access                                      (2 Points)                [ x] General surgery lasting more than 45 minutes   (2 Points)       [ ] Stroke (in the previous month)                          (5 Points)               [ ] Elective arthroplasty                                         (5 Points)                                                                                                                                               HEMATOLOGY RELATED FACTORS                                                 TRAUMA RELATED RISK FACTORS  [ ] Prior episodes of VTE                                     (3 Points)                [ ] Fracture of the hip, pelvis, or leg                       (5 Points)  [ ] Positive family history for VTE                         (3 Points)                 [ ] Acute spinal cord injury (in the previous month)  (5 Points)  [ ] Prothrombin 81625 A                                     (3 Points)                 [ ] Paralysis  (less than 1 month)                             (5 Points)  [ ] Factor V Leiden                                             (3 Points)                  [ ] Multiple Trauma within 1 month                        (5 Points)  [ ] Lupus anticoagulants                                     (3 Points)                                                           [ ] Anticardiolipin antibodies                               (3 Points)                                                       [ ] High homocysteine in the blood                      (3 Points)                                             [ ] Other congenital or acquired thrombophilia      (3 Points)                                                [ ] Heparin induced thrombocytopenia                  (3 Points)                                          Total Score [      4    ]    Caprini Score 0 - 2:  Low Risk, No VTE Prophylaxis required for most patients, encourage ambulation  Caprini Score 3 - 6:  At Risk, pharmacologic VTE prophylaxis is indicated for most patients (in the absence of a contraindication)  Caprini Score Greater than or = 7:  High Risk, pharmacologic VTE prophylaxis is indicated for most patients (in the absence of a contraindication)    OPIOID RISK TOOL    NIDA EACH BOX THAT APPLIES AND ADD TOTALS AT THE END    FAMILY HISTORY OF SUBSTANCE ABUSE                 FEMALE         MALE                                                Alcohol                             [  ]1 pt          [  ]3pts                                               Illegal Durgs                     [  ]2 pts        [  ]3pts                                               Rx Drugs                           [  ]4 pts        [  ]4 pts    PERSONAL HISTORY OF SUBSTANCE ABUSE                                                                                          Alcohol                             [  ]3 pts       [  ]3 pts                                               Illegal Drugs                     [  ]4 pts        [  ]4 pts                                               Rx Drugs                           [  ]5 pts        [  ]5 pts    AGE BETWEEN 16-45 YEARS                                      [  ]1 pt         [  ]1 pt    HISTORY OF PREADOLESCENT   SEXUAL ABUSE                                                             [  ]3 pts        [  ]0pts    PSYCHOLOGICAL DISEASE                     ADD, OCD, Bipolar, Schizophrenia        [  ]2 pts         [  ]2 pts                      Depression                                               [  ]1 pt           [  ]1 pt           SCORING TOTAL   (add numbers and type here)              (*0**)                                     A score of 3 or lower indicated LOW risk for future opioid abuse  A score of 4 to 7 indicated moderate risk for future opioid abuse  A score of 8 or higher indicates a high risk for opioid abuse       50 y/o  LMP 2022 with PMH of breast cancer s/p lumpectomy with reduction on , anxiety and anemia presents with complaints of having abnormal endometrial biopsy. Patient states in 2021 she reported increase vaginal bleeding with heavy clotting over the past year which prompted work up with EMB. 21 EMB: at least complex endometrial hyperplasia with atypia. Denies vaginal pain, cramping or vaginal bleeding. Patient is scheduled for Robot assisted total laparoscopic hysterectomy with bilateral salpingo-oophorectomy, sentinel lymph node mapping with node biopsies, possible cystoscopy with Dr. Carrion on 3/18/22. Patient educated on surgical scrub, ERP protocol, COVID testing scheduled for 3/15/22, preadmission instructions, medical clearance and day of procedure medications, verbalizes understanding. Pt instructed to stop vitamins/supplements/herbal medications/ASA/NSAIDS for one week prior to surgery and discuss with PMD.    CAPRINI SCORE [CLOT]    AGE RELATED RISK FACTORS                                                       MOBILITY RELATED FACTORS  [ x] Age 41-60 years                                            (1 Point)                  [ ] Bed rest                                                        (1 Point)  [ ] Age: 61-74 years                                           (2 Points)                 [ ] Plaster cast                                                   (2 Points)  [ ] Age= 75 years                                              (3 Points)                 [ ] Bed bound for more than 72 hours                 (2 Points)    DISEASE RELATED RISK FACTORS                                               GENDER SPECIFIC FACTORS  [ ] Edema in the lower extremities                       (1 Point)                  [ ] Pregnancy                                                     (1 Point)  [ ] Varicose veins                                               (1 Point)                  [ ] Post-partum < 6 weeks                                   (1 Point)             [x ] BMI > 25 Kg/m2                                            (1 Point)                  [ ] Hormonal therapy  or oral contraception          (1 Point)                 [ ] Sepsis (in the previous month)                        (1 Point)                  [ ] History of pregnancy complications                 (1 point)  [ ] Pneumonia or serious lung disease                                               [ ] Unexplained or recurrent                     (1 Point)           (in the previous month)                               (1 Point)  [ ] Abnormal pulmonary function test                     (1 Point)                 SURGERY RELATED RISK FACTORS  [ ] Acute myocardial infarction                              (1 Point)                 [ ]  Section                                             (1 Point)  [ ] Congestive heart failure (in the previous month)  (1 Point)               [ ] Minor surgery                                                  (1 Point)   [ ] Inflammatory bowel disease                             (1 Point)                 [ ] Arthroscopic surgery                                        (2 Points)  [ ] Central venous access                                      (2 Points)                [ x] General surgery lasting more than 45 minutes   (2 Points)       [ ] Stroke (in the previous month)                          (5 Points)               [ ] Elective arthroplasty                                         (5 Points)                                                                                                                                               HEMATOLOGY RELATED FACTORS                                                 TRAUMA RELATED RISK FACTORS  [ ] Prior episodes of VTE                                     (3 Points)                [ ] Fracture of the hip, pelvis, or leg                       (5 Points)  [ ] Positive family history for VTE                         (3 Points)                 [ ] Acute spinal cord injury (in the previous month)  (5 Points)  [ ] Prothrombin 91504 A                                     (3 Points)                 [ ] Paralysis  (less than 1 month)                             (5 Points)  [ ] Factor V Leiden                                             (3 Points)                  [ ] Multiple Trauma within 1 month                        (5 Points)  [ ] Lupus anticoagulants                                     (3 Points)                                                           [ ] Anticardiolipin antibodies                               (3 Points)                                                       [ ] High homocysteine in the blood                      (3 Points)                                             [ ] Other congenital or acquired thrombophilia      (3 Points)                                                [ ] Heparin induced thrombocytopenia                  (3 Points)                                          Total Score [      4    ]    Caprini Score 0 - 2:  Low Risk, No VTE Prophylaxis required for most patients, encourage ambulation  Caprini Score 3 - 6:  At Risk, pharmacologic VTE prophylaxis is indicated for most patients (in the absence of a contraindication)  Caprini Score Greater than or = 7:  High Risk, pharmacologic VTE prophylaxis is indicated for most patients (in the absence of a contraindication)    OPIOID RISK TOOL    NIDA EACH BOX THAT APPLIES AND ADD TOTALS AT THE END    FAMILY HISTORY OF SUBSTANCE ABUSE                 FEMALE         MALE                                                Alcohol                             [  ]1 pt          [  ]3pts                                               Illegal Durgs                     [  ]2 pts        [  ]3pts                                               Rx Drugs                           [  ]4 pts        [  ]4 pts    PERSONAL HISTORY OF SUBSTANCE ABUSE                                                                                          Alcohol                             [  ]3 pts       [  ]3 pts                                               Illegal Drugs                     [  ]4 pts        [  ]4 pts                                               Rx Drugs                           [  ]5 pts        [  ]5 pts    AGE BETWEEN 16-45 YEARS                                      [  ]1 pt         [  ]1 pt    HISTORY OF PREADOLESCENT   SEXUAL ABUSE                                                             [  ]3 pts        [  ]0pts    PSYCHOLOGICAL DISEASE                     ADD, OCD, Bipolar, Schizophrenia        [  ]2 pts         [  ]2 pts                      Depression                                               [  ]1 pt           [  ]1 pt           SCORING TOTAL   (add numbers and type here)              (*0**)                                     A score of 3 or lower indicated LOW risk for future opioid abuse  A score of 4 to 7 indicated moderate risk for future opioid abuse  A score of 8 or higher indicates a high risk for opioid abuse

## 2022-03-10 NOTE — H&P PST ADULT - NSICDXPASTMEDICALHX_GEN_ALL_CORE_FT
PAST MEDICAL HISTORY:  Anxiety     BRCA negative     Breast cancer, right stage 0 found during reduction    Chronic back pain     Ductal carcinoma of left breast invasive ductal stage 1  ER positive to start medication s/p surgery    GERD (gastroesophageal reflux disease)     H/O vertigo     Joint pain     Plaque psoriasis      PAST MEDICAL HISTORY:  Anxiety     BRCA negative     Breast cancer, right stage 0 found during reduction    Chronic back pain     Ductal carcinoma of left breast invasive ductal stage 1  ER positive to start medication s/p surgery    GERD (gastroesophageal reflux disease)     H/O vertigo     Joint pain B/L knees and b/L hips    Plaque psoriasis

## 2022-03-10 NOTE — H&P PST ADULT - PROBLEM SELECTOR PLAN 1
Medical clearance pending Medical clearance pending  Patient is scheduled for Robot assisted total laparoscopic hysterectomy with bilateral salpingo-oophorectomy, sentinel lymph node mapping with node biopsies, possible cystoscopy with Dr. Carrion on 3/18/22

## 2022-03-10 NOTE — H&P PST ADULT - HISTORY OF PRESENT ILLNESS
50 y/o patient with newly diagnosed breast cancer. She is s/p lumpectomy with reduction on 9/27 and was told she has a stage 1 cancer. Per patient, following the reduction procedure her right breast came back with cancer cells so bilateral mastectomy with DIEPFLAP was recommended. Patient was scheduled on 12/20/21 with Dr. Palleschi and Dr. Zavala. She was initially seen on 12/15/21 when she reported increase vaginal bleeding with heavy clotting over the past year which prompted work up with EMB. 11/29/21 EMB: at least complex endometrial hyperplasia with atypia. During her consultation visit, we discussed my recommendation for a total laparoscopic hysterectomy, bilateral salpingectomy (+/- oophorectomy), possible SLND, possible cystoscopy. I advised the patient to give herself some time to heal from her bilateral mastectomy before the hysterectomy. She presents today for surgical planning following her breast procedure. Today she reports hot flashes and night sweats, consistent with her postsurgical status. She tolerated her procedure well, no additional therapy was warranted following her surgery. She f/u with her oncologist/Dr. Iqra Gates yesterday.     She f/u with Dr. Zavala yesterday for abdominal pain - she has a slight infection on her incision for which she has antibiotics. She is insistent on changing her own dressings and denied home care.     Robot assisted total laparoscopic hysterectomy with bilateral salpingo-oophorectomy, sentinel lymph node mapping with node biopsies 50 y/o  LMP 2022 with PMH of breast cancer s/p lumpectomy with reduction on  and anemia presents with complaints of having abnormal endometrial biopsy.     and was told she has a stage 1 cancer. Per patient, following the reduction procedure her right breast came back with cancer cells so bilateral mastectomy with DIEPFLAP was recommended. Patient was scheduled on 21 with Dr. Palleschi and Dr. Zavala. She was initially seen on 12/15/21 when she reported increase vaginal bleeding with heavy clotting over the past year which prompted work up with EMB. 21 EMB: at least complex endometrial hyperplasia with atypia. During her consultation visit, we discussed my recommendation for a total laparoscopic hysterectomy, bilateral salpingectomy (+/- oophorectomy), possible SLND, possible cystoscopy. I advised the patient to give herself some time to heal from her bilateral mastectomy before the hysterectomy. She presents today for surgical planning following her breast procedure. Today she reports hot flashes and night sweats, consistent with her postsurgical status. She tolerated her procedure well, no additional therapy was warranted following her surgery. She f/u with her oncologist/Dr. Iqra Gates yesterday.     She f/u with Dr. Zavala yesterday for abdominal pain - she has a slight infection on her incision for which she has antibiotics. She is insistent on changing her own dressings and denied home care.     Robot assisted total laparoscopic hysterectomy with bilateral salpingo-oophorectomy, sentinel lymph node mapping with node biopsies    Denies vaginal pain, cramping or vaginal bleeding. States LMP was 2022.  50 y/o  LMP 2022 with PMH of breast cancer s/p lumpectomy with reduction on , anxiety and anemia presents with complaints of having abnormal endometrial biopsy. Patient states in 2021 she reported increase vaginal bleeding with heavy clotting over the past year which prompted work up with EMB. 21 EMB: at least complex endometrial hyperplasia with atypia.     During her consultation visit, we discussed my recommendation for a total laparoscopic hysterectomy, bilateral salpingectomy (+/- oophorectomy), possible SLND, possible cystoscopy. I advised the patient to give herself some time to heal from her bilateral mastectomy before the hysterectomy. She presents today for surgical planning following her breast procedure. Today she reports hot flashes and night sweats, consistent with her postsurgical status. She tolerated her procedure well, no additional therapy was warranted following her surgery. She f/u with her oncologist/Dr. Iqra Gates yesterday.     She f/u with Dr. Zavala yesterday for abdominal pain - she has a slight infection on her incision for which she has antibiotics. She is insistent on changing her own dressings and denied home care.     Robot assisted total laparoscopic hysterectomy with bilateral salpingo-oophorectomy, sentinel lymph node mapping with node biopsies    Denies vaginal pain, cramping or vaginal bleeding. States LMP was 2022.  52 y/o  LMP 2022 with PMH of breast cancer s/p lumpectomy with reduction on , anxiety and anemia presents with complaints of having abnormal endometrial biopsy. Patient states in 2021 she reported increase vaginal bleeding with heavy clotting over the past year which prompted work up with EMB. 21 EMB: at least complex endometrial hyperplasia with atypia. Denies vaginal pain, cramping or vaginal bleeding. Patient is scheduled for Robot assisted total laparoscopic hysterectomy with bilateral salpingo-oophorectomy, sentinel lymph node mapping with node biopsies, possible cystoscopy with Dr. Carrion on 3/18/22.

## 2022-03-10 NOTE — H&P PST ADULT - NSICDXPASTSURGICALHX_GEN_ALL_CORE_FT
PAST SURGICAL HISTORY:  H/O tubal ligation 2005    S/P breast lumpectomy left 9/27/21  lymph node biopsy negative    S/P colonoscopic polypectomy 2021 benign    Status post bilateral breast reduction 1996 9/27/2021 s/p this surgery had infection requiring antibiotics     PAST SURGICAL HISTORY:  H/O bilateral mastectomy with deep flap reconstruction - 12/20/2021    H/O tubal ligation 06/2005    S/P breast lumpectomy left 9/27/21  lymph node biopsy negative    S/P colonoscopic polypectomy 2021 benign    Status post bilateral breast reduction 06/1996 9/27/2021 s/p this surgery had infection requiring antibiotics

## 2022-03-10 NOTE — H&P PST ADULT - ATTENDING COMMENTS
Patient seen in presurgical holding area with her  Keith present for the informed consent discussion.  R/B/A were reviewed & understood; consent is signed & witnessed in the chart.    Xiomara Carrion MD

## 2022-03-10 NOTE — PATIENT PROFILE ADULT - NSFALLSECTIONLABEL_GEN_A_CORE
Ongoing SW/CM Assessment/Plan of Care Note     See SW/CM flowsheets for goals and other objective data. Patient/Family discharge goal (s):  Goal #1: Communication facilitated  Goal #2: Education/provision of information (community resources)       PT Recommendation:       OT Recommendation:       SLP Recommendation:       Disposition:       Progress note:   Pt is day 2 of inpatient hospitalization for CHF. Pt is feeling much better today, Cardiology is following and indicates pt will need ICD after improvement of CHF. [facility] planned on dc for heart failure teaching. CM will follow. .

## 2022-03-17 ENCOUNTER — TRANSCRIPTION ENCOUNTER (OUTPATIENT)
Age: 52
End: 2022-03-17

## 2022-03-18 ENCOUNTER — RESULT REVIEW (OUTPATIENT)
Age: 52
End: 2022-03-18

## 2022-03-18 ENCOUNTER — INPATIENT (INPATIENT)
Facility: HOSPITAL | Age: 52
LOS: 0 days | Discharge: HOME | DRG: 743 | End: 2022-03-18
Attending: OBSTETRICS & GYNECOLOGY | Admitting: OBSTETRICS & GYNECOLOGY
Payer: COMMERCIAL

## 2022-03-18 VITALS
SYSTOLIC BLOOD PRESSURE: 137 MMHG | OXYGEN SATURATION: 98 % | HEART RATE: 92 BPM | HEIGHT: 65 IN | TEMPERATURE: 97 F | WEIGHT: 200.18 LBS | RESPIRATION RATE: 18 BRPM | DIASTOLIC BLOOD PRESSURE: 94 MMHG

## 2022-03-18 VITALS
HEART RATE: 72 BPM | RESPIRATION RATE: 16 BRPM | OXYGEN SATURATION: 100 % | TEMPERATURE: 98 F | DIASTOLIC BLOOD PRESSURE: 85 MMHG | SYSTOLIC BLOOD PRESSURE: 138 MMHG

## 2022-03-18 DIAGNOSIS — Z98.890 OTHER SPECIFIED POSTPROCEDURAL STATES: Chronic | ICD-10-CM

## 2022-03-18 DIAGNOSIS — Z90.13 ACQUIRED ABSENCE OF BILATERAL BREASTS AND NIPPLES: Chronic | ICD-10-CM

## 2022-03-18 DIAGNOSIS — Z98.51 TUBAL LIGATION STATUS: Chronic | ICD-10-CM

## 2022-03-18 DIAGNOSIS — N85.02 ENDOMETRIAL INTRAEPITHELIAL NEOPLASIA [EIN]: ICD-10-CM

## 2022-03-18 LAB
ABO RH CONFIRMATION: SIGNIFICANT CHANGE UP
GLUCOSE BLDC GLUCOMTR-MCNC: 104 MG/DL — HIGH (ref 70–99)
GLUCOSE BLDC GLUCOMTR-MCNC: 106 MG/DL — HIGH (ref 70–99)
HCT VFR BLD CALC: 41.5 % — SIGNIFICANT CHANGE UP (ref 34.5–45)
HGB BLD-MCNC: 13.6 G/DL — SIGNIFICANT CHANGE UP (ref 11.5–15.5)
MCHC RBC-ENTMCNC: 29.1 PG — SIGNIFICANT CHANGE UP (ref 27–34)
MCHC RBC-ENTMCNC: 32.8 GM/DL — SIGNIFICANT CHANGE UP (ref 32–36)
MCV RBC AUTO: 88.7 FL — SIGNIFICANT CHANGE UP (ref 80–100)
PLATELET # BLD AUTO: 276 K/UL — SIGNIFICANT CHANGE UP (ref 150–400)
RBC # BLD: 4.68 M/UL — SIGNIFICANT CHANGE UP (ref 3.8–5.2)
RBC # FLD: 14.6 % — HIGH (ref 10.3–14.5)
WBC # BLD: 13.89 K/UL — HIGH (ref 3.8–10.5)
WBC # FLD AUTO: 13.89 K/UL — HIGH (ref 3.8–10.5)

## 2022-03-18 PROCEDURE — 58571 TLH W/T/O 250 G OR LESS: CPT

## 2022-03-18 PROCEDURE — 88112 CYTOPATH CELL ENHANCE TECH: CPT | Mod: 26

## 2022-03-18 PROCEDURE — 38900 IO MAP OF SENT LYMPH NODE: CPT | Mod: 50

## 2022-03-18 PROCEDURE — 58571 TLH W/T/O 250 G OR LESS: CPT | Mod: AS

## 2022-03-18 PROCEDURE — 38570 LAPAROSCOPY LYMPH NODE BIOP: CPT

## 2022-03-18 PROCEDURE — S2900: CPT

## 2022-03-18 PROCEDURE — 88307 TISSUE EXAM BY PATHOLOGIST: CPT

## 2022-03-18 PROCEDURE — 88112 CYTOPATH CELL ENHANCE TECH: CPT

## 2022-03-18 PROCEDURE — 36415 COLL VENOUS BLD VENIPUNCTURE: CPT

## 2022-03-18 PROCEDURE — 88307 TISSUE EXAM BY PATHOLOGIST: CPT | Mod: 26

## 2022-03-18 PROCEDURE — 38570 LAPAROSCOPY LYMPH NODE BIOP: CPT | Mod: AS

## 2022-03-18 PROCEDURE — 82962 GLUCOSE BLOOD TEST: CPT

## 2022-03-18 PROCEDURE — 38900 IO MAP OF SENT LYMPH NODE: CPT | Mod: AS,50

## 2022-03-18 PROCEDURE — 85027 COMPLETE CBC AUTOMATED: CPT

## 2022-03-18 RX ORDER — HYDROMORPHONE HYDROCHLORIDE 2 MG/ML
0.5 INJECTION INTRAMUSCULAR; INTRAVENOUS; SUBCUTANEOUS
Refills: 0 | Status: DISCONTINUED | OUTPATIENT
Start: 2022-03-18 | End: 2022-03-18

## 2022-03-18 RX ORDER — ACETAMINOPHEN 500 MG
975 TABLET ORAL ONCE
Refills: 0 | Status: COMPLETED | OUTPATIENT
Start: 2022-03-18 | End: 2022-03-18

## 2022-03-18 RX ORDER — CELECOXIB 200 MG/1
400 CAPSULE ORAL ONCE
Refills: 0 | Status: COMPLETED | OUTPATIENT
Start: 2022-03-18 | End: 2022-03-18

## 2022-03-18 RX ORDER — ALPRAZOLAM 0.25 MG
1 TABLET ORAL
Qty: 0 | Refills: 0 | DISCHARGE

## 2022-03-18 RX ORDER — ACETAMINOPHEN 500 MG
1000 TABLET ORAL ONCE
Refills: 0 | Status: COMPLETED | OUTPATIENT
Start: 2022-03-18 | End: 2022-03-18

## 2022-03-18 RX ORDER — USTEKINUMAB 45 MG/.5ML
90 INJECTION, SOLUTION SUBCUTANEOUS
Qty: 0 | Refills: 0 | DISCHARGE

## 2022-03-18 RX ORDER — ANASTROZOLE 1 MG/1
1 TABLET ORAL
Qty: 0 | Refills: 0 | DISCHARGE

## 2022-03-18 RX ORDER — FERROUS SULFATE 325(65) MG
1 TABLET ORAL
Qty: 0 | Refills: 0 | DISCHARGE

## 2022-03-18 RX ORDER — FOLIC ACID 0.8 MG
1 TABLET ORAL
Qty: 0 | Refills: 0 | DISCHARGE

## 2022-03-18 RX ORDER — DOCUSATE SODIUM 100 MG
1 CAPSULE ORAL
Qty: 0 | Refills: 0 | DISCHARGE

## 2022-03-18 RX ORDER — ESOMEPRAZOLE MAGNESIUM 40 MG/1
1 CAPSULE, DELAYED RELEASE ORAL
Qty: 0 | Refills: 0 | DISCHARGE

## 2022-03-18 RX ADMIN — Medication 200 MILLIGRAM(S): at 14:00

## 2022-03-18 RX ADMIN — Medication 400 MILLIGRAM(S): at 18:15

## 2022-03-18 RX ADMIN — Medication 975 MILLIGRAM(S): at 12:17

## 2022-03-18 RX ADMIN — Medication 1000 MILLIGRAM(S): at 18:30

## 2022-03-18 RX ADMIN — HYDROMORPHONE HYDROCHLORIDE 0.5 MILLIGRAM(S): 2 INJECTION INTRAMUSCULAR; INTRAVENOUS; SUBCUTANEOUS at 18:30

## 2022-03-18 RX ADMIN — Medication 100 MILLIGRAM(S): at 14:00

## 2022-03-18 RX ADMIN — HYDROMORPHONE HYDROCHLORIDE 0.5 MILLIGRAM(S): 2 INJECTION INTRAMUSCULAR; INTRAVENOUS; SUBCUTANEOUS at 18:00

## 2022-03-18 RX ADMIN — HYDROMORPHONE HYDROCHLORIDE 0.5 MILLIGRAM(S): 2 INJECTION INTRAMUSCULAR; INTRAVENOUS; SUBCUTANEOUS at 18:15

## 2022-03-18 RX ADMIN — CELECOXIB 400 MILLIGRAM(S): 200 CAPSULE ORAL at 12:17

## 2022-03-18 NOTE — ASU DISCHARGE PLAN (ADULT/PEDIATRIC) - ACTIVITY LEVEL
May walk and climb stairs as often as youd like, no vigorous activity, do not lift anything greater than 10lbs, nothing per vagina x 8 weeks, do not drive while on pain medication.

## 2022-03-18 NOTE — BRIEF OPERATIVE NOTE - NSICDXBRIEFPROCEDURE_GEN_ALL_CORE_FT
PROCEDURES:  Robot-assisted laparoscopic total hysterectomy with bilateral salpingo-oophorectomy (BSO) and lymph node sampling 18-Mar-2022 17:51:23  Ryne Rothman

## 2022-03-18 NOTE — CHART NOTE - NSCHARTNOTEFT_GEN_A_CORE
Pt seen at bedside postop  She is doing well, no acute complaints  She denies N/V, she is tolerating PO fluids and eating  Voiding spontaneously x5  Hgb stable  Pain medication sent to pharmacy  Incisions are CDI covered with opsites  Stable for d/c home    Vital Signs Last 24 Hrs  T(C): 36.3 (18 Mar 2022 20:00), Max: 36.3 (18 Mar 2022 11:54)  T(F): 97.3 (18 Mar 2022 20:00), Max: 97.4 (18 Mar 2022 11:54)  HR: 73 (18 Mar 2022 20:00) (61 - 98)  BP: 138/73 (18 Mar 2022 20:00) (115/79 - 157/60)  BP(mean): 91 (18 Mar 2022 19:45) (81 - 95)  RR: 15 (18 Mar 2022 20:00) (14 - 22)  SpO2: 99% (18 Mar 2022 20:00) (96% - 100%)

## 2022-03-18 NOTE — ASU DISCHARGE PLAN (ADULT/PEDIATRIC) - ASU DC SPECIAL INSTRUCTIONSFT
A PA will call you in 1 week to follow up how you are recovering post operatively.   Follow-up with Dr. Adame in two weeks to review pathology report.     Please contact your provider for any pain uncontrolled by medication, excessive bleeding or Fever>100.4  Please take Naprosyn 1 tablet every 12 hours x 14 days, may take percocet as prescribed for breakthrough pain. A PA will call you in 1 week to follow up how you are recovering post operatively.   Follow-up with Dr. Carrion in two weeks to review pathology report.     Please contact your provider for any pain uncontrolled by medication, excessive bleeding or Fever>100.4  Please take Naprosyn 1 tablet every 12 hours x 14 days, may take percocet as prescribed for breakthrough pain.

## 2022-03-18 NOTE — BRIEF OPERATIVE NOTE - OPERATION/FINDINGS
grossly normal uterus, left and right fallopian tubes, left and right ovaries   no evidence of extrauterine disease     no masses/defects/bleeding/abnormalities noted on general survey of pelvis and abdomen including RUQ, LUQ, colic gutters, omentum, or beneath abdominal trochar placement sites

## 2022-03-18 NOTE — ASU DISCHARGE PLAN (ADULT/PEDIATRIC) - CARE PROVIDER_API CALL
Mick Adame)  Gynecologic Oncology; Obstetrics and Gynecology  [address] [place] [address]  Phone: [phone]  Fax: [phone]  Follow Up Time:    Xiomara Carrion)  Gynecologic Oncology; Obstetrics and Gynecology  [address] [place] [address]  Phone: [phone]  Fax: [phone]  Follow Up Time:

## 2022-03-18 NOTE — ASU DISCHARGE PLAN (ADULT/PEDIATRIC) - NS MD DC FALL RISK RISK
For information on Fall & Injury Prevention, visit: [email or web address] OR  [email or web address] OR  https://www.cdc.gov/steadi/patient.html

## 2022-03-18 NOTE — BRIEF OPERATIVE NOTE - SPECIMENS
uterus, cervix, left and right fallopian tubes, left and right ovaries, left and right sentinel lymph nodes, pelvic washings

## 2022-03-18 NOTE — ASU DISCHARGE PLAN (ADULT/PEDIATRIC) - PROCEDURE
robotic assisted total laparoscopic hysterectomy, bilateral salpingo-oophorectomy, sentinel lymph node dissection

## 2022-03-21 PROBLEM — M25.50 PAIN IN UNSPECIFIED JOINT: Chronic | Status: ACTIVE | Noted: 2021-09-23

## 2022-03-21 LAB — GLUCOSE BLDC GLUCOMTR-MCNC: 119 MG/DL — HIGH (ref 70–99)

## 2022-03-22 LAB — NON-GYNECOLOGICAL CYTOLOGY STUDY: SIGNIFICANT CHANGE UP

## 2022-03-23 ENCOUNTER — NON-APPOINTMENT (OUTPATIENT)
Age: 52
End: 2022-03-23

## 2022-03-23 DIAGNOSIS — Z09 ENCOUNTER FOR FOLLOW-UP EXAMINATION AFTER COMPLETED TREATMENT FOR CONDITIONS OTHER THAN MALIGNANT NEOPLASM: ICD-10-CM

## 2022-03-23 NOTE — DISCUSSION/SUMMARY
[Doing Well] : is doing well [Excellent Pain Control] : has excellent pain control [No Sign of Infection] : is showing no signs of infection [Reviewed] : reviewed [Clinical Recheck] : clinical recheck

## 2022-03-23 NOTE — REASON FOR VISIT
[Home] : at home, [unfilled] , at the time of the visit. [Medical Office: ([facility])___] : at the medical office located in  [Post Op] : post op visit [de-identified] : 3/18/22 [de-identified] : KATHY TLH BSO, SLNM [de-identified] : Pt is recuperating well overall. Pain is well controlled with Percocet which she is taking once daily PRN. Denies fever, cp, sob, n/v or calf pain. Denies VB. She is ambulating independently. She has normal bowel and bladder function. She reports her incision is healing well with no redness, drainage or signs of infection.\par

## 2022-03-28 LAB — SURGICAL PATHOLOGY STUDY: SIGNIFICANT CHANGE UP

## 2022-03-30 ENCOUNTER — APPOINTMENT (OUTPATIENT)
Dept: GYNECOLOGIC ONCOLOGY | Facility: CLINIC | Age: 52
End: 2022-03-30
Payer: COMMERCIAL

## 2022-03-30 VITALS
HEIGHT: 65 IN | BODY MASS INDEX: 33.32 KG/M2 | TEMPERATURE: 98.1 F | WEIGHT: 200 LBS | SYSTOLIC BLOOD PRESSURE: 114 MMHG | DIASTOLIC BLOOD PRESSURE: 82 MMHG

## 2022-03-30 VITALS
BODY MASS INDEX: 33.32 KG/M2 | HEIGHT: 65 IN | DIASTOLIC BLOOD PRESSURE: 82 MMHG | SYSTOLIC BLOOD PRESSURE: 114 MMHG | WEIGHT: 200 LBS

## 2022-03-30 DIAGNOSIS — E89.40 ASYMPTOMATIC POSTPROCEDURAL OVARIAN FAILURE: ICD-10-CM

## 2022-03-30 DIAGNOSIS — N85.02 ENDOMETRIAL INTRAEPITHELIAL NEOPLASIA [EIN]: ICD-10-CM

## 2022-03-30 PROCEDURE — 99213 OFFICE O/P EST LOW 20 MIN: CPT | Mod: 24

## 2022-04-05 NOTE — REASON FOR VISIT
[Post Op] : post op visit [de-identified] : 3/18/22 [de-identified] : Robotic-assisted total laparoscopic hysterectomy with bilateral salpingo-oophorectomy, sentinel lymph node mapping with lymph node biopsies for complex atypical endometrial hyperplasia at [facility] [de-identified] : The patient is healing remarkably well. She denies a change in appetite, or a change in weight. She is tolerating PO without nausea or vomiting. She denies VB/VD. She denies CP/SOB.\par \par Patient with significant menopausal symptoms s/p hysterectomy.

## 2022-04-05 NOTE — DISCUSSION/SUMMARY
[Clean] : was clean [Intact] : was intact [None] : had no drainage [Normal Skin] : normal appearance [Erythema] : was not erythematous [Dry] : was not dry [Ecchymosis] : was not ecchymotic [Seroma] : had no seroma [Firm] : soft [Tender] : nontender [Abnormal Bowel Sounds] : normal bowel sounds [Rebound] : no rebound tenderness [Guarding] : no guarding [Incisional Hernia] : no incisional hernia [Mass] : no palpable mass [External Genitalia Abnormal] : normal external genitalia [Vaginal Exam Abnormal] : normal vaginal exam [de-identified] : Laura Hoffmann MA was present the entire time of gynecological exam.  [FreeTextEntry1] : OPERATIVE FINDINGS:  Grossly normal uterus with smooth serosa.  Normal bilateral fallopian tubes and ovaries.  No evidence of extrauterine disease.  Normal anatomic upper abdominal survey and no pelvic disease.  Successful  bilateral lymphatic mapping.\par \par PATHOLOGY:\par 1  Right sentinel pelvic lymph node:\par - Four lymph nodes, negative for metastatic carcinoma (0/4).\par \par 2  Left sentinel pelvic lymph node\par -  Four lymph nodes, negative for metastatic carcinoma (0/4).\par 3  Uterus, cervix, bilateral fallopian tubes and ovaries:\par - Uterus with focal complex hyperplasia with atypia involving endometrial\par polyp and endometrium and extending into area of adenomyosis.\par - Negative cervix and negative lower uterine segment.\par - Histologically unremarkable fallopian tubes and ovaries as well as left\par and right parametria.\par - Intramural leiomyoma and adenomyosis.\par \par PW negative

## 2022-04-05 NOTE — ASSESSMENT
[FreeTextEntry1] : The patient is healing well without complication. We discussed ongoing recuperation and reviewed final pathology results in detail - a copy was provided to the patient. Reviewed menopausal symptoms and options for medical management. Amberen is known to reduce menopausal symptoms if taken once daily - I recommend patient to trial Amberen if symptoms are effecting her QOL. Effexor has also been known to effectively manage menopausal symptoms which I also recommend for patient to use. She plans to have breast reconstruction done - she knows to wait about a month before undergoing another procedure. We reviewed the need for ongoing GYN visits and a recommended plan to follow up with regular GYN/Dr. Sal. The patient stated and expressed a good understanding of the above information.

## 2022-04-05 NOTE — END OF VISIT
[FreeTextEntry3] : Written by Laura Hoffmann, acting as a scribe for Dr. Xiomara Carrion.\par This note accurately reflects the work and decisions made by me.

## 2022-04-07 ENCOUNTER — NON-APPOINTMENT (OUTPATIENT)
Age: 52
End: 2022-04-07

## 2022-04-22 ENCOUNTER — RX CHANGE (OUTPATIENT)
Age: 52
End: 2022-04-22

## 2022-05-05 ENCOUNTER — APPOINTMENT (OUTPATIENT)
Dept: GYNECOLOGIC ONCOLOGY | Facility: CLINIC | Age: 52
End: 2022-05-05
Payer: COMMERCIAL

## 2022-05-05 PROCEDURE — 99213 OFFICE O/P EST LOW 20 MIN: CPT | Mod: 24,95

## 2022-05-05 RX ORDER — VENLAFAXINE 37.5 MG/1
37.5 TABLET ORAL DAILY
Qty: 30 | Refills: 1 | Status: COMPLETED | COMMUNITY
Start: 2022-03-30 | End: 2022-05-05

## 2022-05-20 NOTE — DISCHARGE NOTE PROVIDER - NSDCCPGOAL_GEN_ALL_CORE_FT
To get better and follow your care plan as instructed.
PAST MEDICAL HISTORY:  Diabetes mellitus     Hypercholesteremia

## 2022-05-20 NOTE — REASON FOR VISIT
[Post Op] : post op visit [Home] : at home, [unfilled] , at the time of the visit. [Medical Office: ([facility])___] : at the medical office located in  [Verbal consent obtained from patient] : the patient, [unfilled] [de-identified] : 3/18/22 [de-identified] : Robotic-assisted total laparoscopic hysterectomy with bilateral salpingo-oophorectomy, sentinel lymph node mapping with lymph node biopsies for complex atypical endometrial hyperplasia at [facility]  [de-identified] : The patient is healing remarkably well. She denies a change in appetite, or a change in weight. She is tolerating PO without nausea or vomiting. She denies VB/VD. She denies CP/SOB.\par \par She was seen in office for a post operative appointment on 3/30/22 where she reported significant menopausal symptoms s/p hysterectomy. I d/w the patient different options to medically manage these symptoms s/u Amberen and/or Effexor. She was amendable to try these options to manage her symptoms and presents today to f/u on how she's been feeling. \par \par She contacted my office on 4/7 with c/o side effects of Effexor/Amberen including light-headedness & dizziness. \par She stopped taking all medication in anticipation of surgery (flap reconstruction, umbilicoplasty) this upcoming Monday. She continues to have hot flashes & mild mood changes. She also admits to decreased libido. \par \par She reports continued tenderness on the largest incision. She stopped using the abdominal binder due to discomfort.

## 2022-05-20 NOTE — ASSESSMENT
[FreeTextEntry1] : I advised the patient that it may be beneficial to continue with Effexor for one more month although given her continued side effects, I also discussed other options to improve menopausal symptoms including Clonidine (discussed side effects including constipation, sedation, dry mouth, dizziness) & Gabapentin which is typically used for overnight hot flashes in a gynecologic setting - patient's hot flashes frequent during the day.  She may also consider other SSRI's (Paxil or Celexa suggested) in order to help manage her symptoms. She understands that these medications take about a week to take effect. She knows to hold off on starting this medication until after her upcoming procedure. She should f/u with me again in 1 month to ensure her symptoms are well managed.

## 2022-05-20 NOTE — END OF VISIT
[Time Spent: ___ minutes] : I have spent [unfilled] minutes of time on the encounter. [FreeTextEntry3] : Written by Laura Hoffmann, acting as a scribe for Dr. Xiomara Carrion.\par This note accurately reflects the work and decisions made by me.

## 2022-06-03 ENCOUNTER — RX CHANGE (OUTPATIENT)
Age: 52
End: 2022-06-03

## 2022-06-09 ENCOUNTER — APPOINTMENT (OUTPATIENT)
Dept: GYNECOLOGIC ONCOLOGY | Facility: CLINIC | Age: 52
End: 2022-06-09
Payer: COMMERCIAL

## 2022-06-09 DIAGNOSIS — N95.1 MENOPAUSAL AND FEMALE CLIMACTERIC STATES: ICD-10-CM

## 2022-06-09 PROCEDURE — 99213 OFFICE O/P EST LOW 20 MIN: CPT | Mod: 24,95

## 2022-06-21 NOTE — ASSESSMENT
[FreeTextEntry1] : I discussed with patient that she should d/w Dr. Vee regarding Anastrozole, its side effects. Given resolution of most of the patient's symptoms, I recommend she continue on Paxil 10 mg for management of hot flashes. She may return PRN for any concerns, otherwise patient should resume routine well woman care with her gynecologist.

## 2022-06-21 NOTE — HISTORY OF PRESENT ILLNESS
[FreeTextEntry1] : 52 y/o patient s/p RA TLH BSO, SLNM with LN biopsies on 3/18/22. She was seen in office for a post operative appointment on 3/30/22 where she reported significant menopausal symptoms s/p hysterectomy. I d/w the patient different options to medically manage these symptoms s/u Amberen and/or Effexor. She was amenable to try these options to manage her symptoms. She contacted my office on 4/7 with c/o side effects of Effexor/Amberen including light-headedness & dizziness.\par She stopped taking all medication in anticipation of surgery (flap reconstruction, umbilicoplasty) on 5/9. After f/u via Telehealth on 5/5, I recommended patient to consider SSRI's as alternative intervention for her symptoms. She presents today to f/u on previously reported menopausal-related symptoms.\par \par Today she endorses tolerating 10 mg Paxil well although she continues to have hot flashes & "night sweats", not as severe as prior. Continues on Anastrozole, managed by Dr. Cannon.

## 2022-06-21 NOTE — REASON FOR VISIT
[Home] : at home, [unfilled] , at the time of the visit. [Medical Office: ([facility])___] : at the medical office located in  [Patient] : the patient [FreeTextEntry1] : [place] Location\par \par [facility] [phone] at [address] [place] [id number] \par \par Complex atypical endometrial hyperplasia

## 2022-07-01 ENCOUNTER — TRANSCRIPTION ENCOUNTER (OUTPATIENT)
Age: 52
End: 2022-07-01

## 2022-07-05 ENCOUNTER — TRANSCRIPTION ENCOUNTER (OUTPATIENT)
Age: 52
End: 2022-07-05

## 2022-08-31 ENCOUNTER — RX RENEWAL (OUTPATIENT)
Age: 52
End: 2022-08-31

## 2022-09-13 ENCOUNTER — APPOINTMENT (OUTPATIENT)
Dept: ORTHOPEDIC SURGERY | Facility: CLINIC | Age: 52
End: 2022-09-13
Payer: COMMERCIAL

## 2022-09-13 VITALS — WEIGHT: 193 LBS | BODY MASS INDEX: 32.95 KG/M2 | HEIGHT: 64 IN

## 2022-09-13 DIAGNOSIS — M54.16 RADICULOPATHY, LUMBAR REGION: ICD-10-CM

## 2022-09-13 DIAGNOSIS — C80.1 MALIGNANT (PRIMARY) NEOPLASM, UNSPECIFIED: ICD-10-CM

## 2022-09-13 DIAGNOSIS — M19.90 UNSPECIFIED OSTEOARTHRITIS, UNSPECIFIED SITE: ICD-10-CM

## 2022-09-13 DIAGNOSIS — M25.559 PAIN IN UNSPECIFIED HIP: ICD-10-CM

## 2022-09-13 DIAGNOSIS — E78.00 PURE HYPERCHOLESTEROLEMIA, UNSPECIFIED: ICD-10-CM

## 2022-09-13 PROCEDURE — 72100 X-RAY EXAM L-S SPINE 2/3 VWS: CPT

## 2022-09-13 PROCEDURE — 72170 X-RAY EXAM OF PELVIS: CPT

## 2022-09-13 PROCEDURE — 99214 OFFICE O/P EST MOD 30 MIN: CPT

## 2022-09-13 PROCEDURE — 99215 OFFICE O/P EST HI 40 MIN: CPT

## 2022-09-13 RX ORDER — DICLOFENAC SODIUM 75 MG/1
75 TABLET, DELAYED RELEASE ORAL TWICE DAILY
Qty: 60 | Refills: 1 | Status: COMPLETED | COMMUNITY
Start: 2022-09-13 | End: 2022-11-12

## 2022-09-13 NOTE — DISCUSSION/SUMMARY
[de-identified] : 1) I recommend the patient consult with pain management for epidural injections.\par 2) The patient will undergo an MRI for evaluating the source of bilateral L5 and S1 radiculopathy\par 3) Diclofenac Rx 75mg given to the pt.  The risks, benefits and alternatives of this medication was discussed and the aptient was advised to take this medication twice daily after meals PRN.\par 3) I will discuss further treatment after MRI\par \par The patient will continue with conservative treatment and will F/U after MRI\par The patient may also schedule to be treated for the B/L Shoulders.\par \par \par The patient was advised of the diagnosis.  The natural history of the pathology was explained in full to the patient in layman's terms, including but not limited to the risks, symptoms and available options for treatment.  We discussed the risks, benefits and alternatives of the treatment options and the advice I provided to the patient as listed above.  Pt was given the opportunity to ask questions, and all questions were answered.  The discussion was not limited to the above.\par \par Entered by Babak Cruz acting as scribe.\par

## 2022-09-13 NOTE — HISTORY OF PRESENT ILLNESS
[Gradual] : gradual [9] : 9 [7] : 7 [Dull/Aching] : dull/aching [Stabbing] : stabbing [Constant] : constant [Household chores] : household chores [Leisure] : leisure [Sleep] : sleep [Meds] : meds [Exercising] : exercising [Retired] : Work status: retired [de-identified] : 09/13/2022:RT Hip, Lower Back\par Pt reports that she has been experiencing pain in B/L Hips intermittently for the past two months. Pain is just lateral to the SI joints.  Pt reports she has been experiencing sciatic pains for a few years. Pt reports that she has pain when standing up after sitting or lying down.  Pt reports that PT was helpful in the past, prescribed for strengthening of muscles weakened in the course of B/L breast reconstructive surgery. Pt denies any traumatic injury.\par \par \par \par \par \par \par \par \par \par \par  [] : no [FreeTextEntry1] : RT Hip and Lower back [FreeTextEntry9] : Aleve

## 2022-10-11 ENCOUNTER — NON-APPOINTMENT (OUTPATIENT)
Age: 52
End: 2022-10-11

## 2022-10-13 ENCOUNTER — APPOINTMENT (OUTPATIENT)
Dept: ORTHOPEDIC SURGERY | Facility: CLINIC | Age: 52
End: 2022-10-13
Payer: COMMERCIAL

## 2022-10-13 DIAGNOSIS — M54.12 RADICULOPATHY, CERVICAL REGION: ICD-10-CM

## 2022-10-13 DIAGNOSIS — M25.512 PAIN IN LEFT SHOULDER: ICD-10-CM

## 2022-10-13 PROCEDURE — 20552 NJX 1/MLT TRIGGER POINT 1/2: CPT

## 2022-10-13 PROCEDURE — 99214 OFFICE O/P EST MOD 30 MIN: CPT | Mod: 25

## 2022-10-13 PROCEDURE — 72040 X-RAY EXAM NECK SPINE 2-3 VW: CPT

## 2022-10-13 PROCEDURE — 99204 OFFICE O/P NEW MOD 45 MIN: CPT | Mod: 25

## 2022-10-13 PROCEDURE — 73030 X-RAY EXAM OF SHOULDER: CPT | Mod: LT

## 2022-10-13 PROCEDURE — J3490M: CUSTOM

## 2022-10-13 NOTE — PROCEDURE
[Trigger point 1-2 muscle groups] : trigger point 1-2 muscle groups [Left] : of the left [Cervical paraspinal muscle] : cervical paraspinal muscle [Pain] : pain [Alcohol] : alcohol [Betadine] : betadine [Ethyl Chloride sprayed topically] : ethyl chloride sprayed topically [___ cc    1%] : Lidocaine ~Vcc of 1%  [___ cc    0.25%] : Bupivacaine (Marcaine) ~Vcc of 0.25%  [___ cc    10mg] : Triamcinolone (Kenalog) ~Vcc of 10 mg

## 2022-10-13 NOTE — HISTORY OF PRESENT ILLNESS
[Sudden] : sudden [10] : 10 [9] : 9 [Dull/Aching] : dull/aching [Localized] : localized [Radiating] : radiating [Sharp] : sharp [Constant] : constant [Household chores] : household chores [Leisure] : leisure [Social interactions] : social interactions [Rest] : rest [Sitting] : sitting [Standing] : standing [Walking] : walking [Stairs] : stairs [de-identified] : 10/13/22:  Patient is a 51 year old RHD F who presents today for evaluation of their neck. Pt states that she has severe neck pain that started 10/6/22 that she attributed to sleeping wrong. Pain increased over the next 2 days with radiation down her arm to her wrist. right arm is okay.  \par \par Went to [facility] where they said her trapezius is impinging and given Toradol and Flexeril w/ some relief but still in pain.\par  Seen by Dr Tracey 9/13/22 and given diclofenac which has helped. \par \par Denies trauma/previous injury. + numbness/tingling.\par \par breast cancer - had 4x surgeries \par endormetrial issues - ha hystectectomy \par HLD\par \par xrays today:\par C spine - C5-7 spondylosis\par L shoulder - no obvious fracture \par \par not working - out since june 2020  [] : Post Surgical Visit: no [FreeTextEntry1] : Neck [FreeTextEntry7] : left arm

## 2022-10-13 NOTE — PHYSICAL EXAM
[Biceps 2+] : biceps 2+ [Triceps 2+] : triceps 2+ [Brachioradialis 2+] : brachioradialis 2+ [Left] : left shoulder [NL (0-180)] : full active abduction 0-180 degrees [NL (0-70)] : full internal rotation 0-70 degrees [NL (0-90)] : full external rotation 0-90 degrees [5 ___] : forward flexion 5[unfilled]/5 [5___] : internal rotation 5[unfilled]/5 [TWNoteComboBox7] : forward flexion 30 degrees [de-identified] : extension 20 degrees [de-identified] : left lateral flexion 20 degrees [de-identified] : left lateral rotation 45 degrees [de-identified] : right lateral flexion 20 degrees [TWNoteComboBox6] : right lateral rotation 60 degrees [] : negative Demetri

## 2022-10-13 NOTE — DISCUSSION/SUMMARY
[de-identified] : cervical radiculopathy with degnerative changes noted on the xrays - severe radiculopathy \par was in the urgent care yesterday with no relief\par indicated for MRi C spine \par PT \par MDP \par fu to review the MRi \par TPI C spine tolerated well

## 2022-10-28 ENCOUNTER — NON-APPOINTMENT (OUTPATIENT)
Age: 52
End: 2022-10-28

## 2022-11-10 NOTE — PRE-ANESTHESIA EVALUATION ADULT - NSANTHSNORERD_ENT_A_CORE
Follow Up Visit     Karla Mukherjee returns today for follow up visit regarding right knee bone-on-bone osteoarthritis. Previous steroid injection August 11, 2022 provided symptom relief up until 3 weeks ago. Patient is interested in repeating injections today. REVIEW OF SYSTEMS:     Constitutional:  Negative for weight loss, fevers, chills, fatigue  Cardiovascular: Negative for chest pain, palpitations  Pulmonary: Negative for shortness of breath, labored breathing, cough  GI: negative for abdominal pain, nausea, vomitting   MSK: per HPI  Skin: negative for rash, open wounds    All other systems reviewed and are negative       Physical Exam:     No data recorded    General: Alert and oriented x3, no acute distress  Cardiovascular/pulmonary: No labored breathing, peripheral perfusion intact  Musculoskeletal:    Right knee exam range of motion 0-130, positive joint line tenderness. No laxity present with valgus or varus stress at 0 or 30 degrees. Patella stable tracking midline with crepitus. Controlled Substances Monitoring:      Imaging:  No new imaging obtained today. Previous imaging showing bone-on-bone degenerative changes to the right knee    Procedure Note: Knee Cortisone injection     The right knee was identified as the injection site. The risk and benefits of a cortisone injection were explained and the patient consented to the injection. Under sterile conditions, the knee was injected with a mixture of 40 mg of Kenalog and 4 mL of 1% Lidocaine without complication. A sterile bandage was applied.     Administrations This Visit       lidocaine 1 % injection 4 mL       Admin Date  11/10/2022 Action  Given Dose  4 mL Route  Intra-artICUlar Administered By  Queen Shemar MA              triamcinolone acetonide (KENALOG-40) injection 40 mg       Admin Date  11/10/2022 Action  Given Dose  40 mg Route  Intra-artICUlar Administered By  Queen Shemar MA                        Assessment: Right knee osteoarthritis      Plan:   Patient has done well previously with conservative treatment for her right knee osteoarthritis. She would like to continue with conservative treatment today. Patient was provided with right knee cortisone injection. She will follow-up in 3 months.       Keo Mireles, [facility], [place]  Orthopedic Surgery   11/10/22  1:50 PM Yes

## 2022-11-18 ENCOUNTER — APPOINTMENT (OUTPATIENT)
Dept: ORTHOPEDIC SURGERY | Facility: CLINIC | Age: 52
End: 2022-11-18

## 2022-11-18 DIAGNOSIS — M54.2 CERVICALGIA: ICD-10-CM

## 2022-11-18 DIAGNOSIS — M50.20 OTHER CERVICAL DISC DISPLACEMENT, UNSPECIFIED CERVICAL REGION: ICD-10-CM

## 2022-11-18 PROCEDURE — 99214 OFFICE O/P EST MOD 30 MIN: CPT

## 2022-11-18 NOTE — HISTORY OF PRESENT ILLNESS
[Sudden] : sudden [7] : 7 [3] : 3 [Dull/Aching] : dull/aching [Localized] : localized [Radiating] : radiating [Sharp] : sharp [Constant] : constant [Household chores] : household chores [Leisure] : leisure [Social interactions] : social interactions [Rest] : rest [Sitting] : sitting [Standing] : standing [Walking] : walking [Stairs] : stairs [Retired] : Work status: retired [de-identified] : 10/13/22:  Patient is a 51 year old RHD F who presents today for evaluation of their neck. Pt states that she has severe neck pain that started 10/6/22 that she attributed to sleeping wrong. Pain increased over the next 2 days with radiation down her arm to her wrist. right arm is okay.  \par \par Went to [facility] where they said her trapezius is impinging and given Toradol and Flexeril w/ some relief but still in pain.\par  Seen by Dr Tracey 9/13/22 and given diclofenac which has helped. \par \par Denies trauma/previous injury. + numbness/tingling.\par \par breast cancer - had 4x surgeries \par endormetrial issues - ha hystectectomy \par HLD\par \par xrays today:\par C spine - C5-7 spondylosis\par L shoulder - no obvious fracture \par \par not working - out since june 2020 \par \par 11/18/22: Here to review MRI - plan at last was "cervical radiculopathy with degnerative changes noted on the xrays - severe radiculopathy - was in the urgent care yesterday with no relief - indicated for MRi C spine - PT - MDP - fu to review the MRi - TPI C spine tolerated well " - overall symptoms continue - down the left arm \par has started with PT \par gabapentin helps \par \par MRI C spine: \par C4-5 and C5-6 disc herniations deforming the thecal sac abutting the spinal cord with C4-5 right neural foraminal narrowing and C5-6 bilateral neural foraminal narrowing in conjunction with facet and uncinate hypertrophic changes, in addition to C5-6 disc degeneration and paracentral osseous hypertrophic changes.\par C3-4, C6-7 and C7-T1 disc bulges with C6-7 bilateral neural foraminal narrowing in conjunction with facet and uncinate hypertrophy.\par Cervical spine straightening.\par Mild bilateral maxillary sinus mucosal thickening.\par Image clarity diminished in conjunction with patient motion limiting examination. [] : Post Surgical Visit: no [FreeTextEntry1] : Neck [FreeTextEntry7] : left arm [de-identified] : PT, gabapentin

## 2022-11-18 NOTE — DISCUSSION/SUMMARY
[de-identified] : MRI reviewed with patient - discussion of tx options \par left sided cervical radiculopathy \par would be a candidate for acdf C5-7 \par SHE would like to try try a ABRAHAM \par \par shes going f/u with to review the mRI of the lumbar MRi \par

## 2022-11-18 NOTE — PHYSICAL EXAM
[Biceps 2+] : biceps 2+ [Triceps 2+] : triceps 2+ [Brachioradialis 2+] : brachioradialis 2+ [Left] : left shoulder [NL (0-180)] : full active abduction 0-180 degrees [NL (0-70)] : full internal rotation 0-70 degrees [NL (0-90)] : full external rotation 0-90 degrees [5 ___] : forward flexion 5[unfilled]/5 [5___] : internal rotation 5[unfilled]/5 [TWNoteComboBox7] : forward flexion 30 degrees [de-identified] : extension 20 degrees [de-identified] : left lateral flexion 20 degrees [de-identified] : left lateral rotation 45 degrees [de-identified] : right lateral flexion 20 degrees [TWNoteComboBox6] : right lateral rotation 60 degrees [] : negative Demetri

## 2022-11-26 NOTE — PATIENT PROFILE ADULT - NS PRO AD PATIENT TYPE
1. Diet adv as medically feasible 24-48 hrs  2. Consistently meet >75% est needs during hospital stay 
Health Care Proxy (HCP)

## 2022-12-13 ENCOUNTER — APPOINTMENT (OUTPATIENT)
Dept: PLASTIC SURGERY | Facility: CLINIC | Age: 52
End: 2022-12-13

## 2022-12-13 VITALS
HEIGHT: 64 IN | OXYGEN SATURATION: 97 % | SYSTOLIC BLOOD PRESSURE: 120 MMHG | HEART RATE: 93 BPM | DIASTOLIC BLOOD PRESSURE: 86 MMHG | RESPIRATION RATE: 16 BRPM | BODY MASS INDEX: 33.46 KG/M2 | TEMPERATURE: 97.6 F | WEIGHT: 196 LBS

## 2022-12-13 PROCEDURE — 99213 OFFICE O/P EST LOW 20 MIN: CPT

## 2022-12-13 RX ORDER — DIAPER,BRIEF,ADULT, DISPOSABLE
EACH MISCELLANEOUS
Refills: 0 | Status: ACTIVE | COMMUNITY

## 2022-12-13 RX ORDER — ANASTROZOLE TABLETS 1 MG/1
1 TABLET ORAL
Refills: 0 | Status: ACTIVE | COMMUNITY

## 2022-12-13 RX ORDER — TIZANIDINE 2 MG/1
2 TABLET ORAL EVERY 6 HOURS
Qty: 20 | Refills: 0 | Status: ACTIVE | COMMUNITY
Start: 2022-10-13

## 2022-12-13 RX ORDER — GABAPENTIN 300 MG/1
300 CAPSULE ORAL
Qty: 60 | Refills: 0 | Status: ACTIVE | COMMUNITY
Start: 2022-10-20

## 2022-12-13 NOTE — ASSESSMENT
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 Follow up office visit  6/2023. \par 2. Advised monthly self breast examinations and advised her to contact me if she has any concerns.

## 2022-12-13 NOTE — PHYSICAL EXAM
[Normocephalic] : normocephalic [Atraumatic] : atraumatic [Supple] : supple [No Supraclavicular Adenopathy] : no supraclavicular adenopathy [Examined in the supine and seated position] : examined in the supine and seated position [No dominant masses] : no dominant masses in right breast  [No dominant masses] : no dominant masses left breast [No Nipple Retraction] : no left nipple retraction [No Nipple Discharge] : no left nipple discharge [No Axillary Lymphadenopathy] : no left axillary lymphadenopathy [No Edema] : no edema [No Rashes] : no rashes [No Ulceration] : no ulceration [No Cervical Adenopathy] : no cervical adenopathy [No Thyromegaly] : no thyromegaly [EOMI] : extra ocular movement intact [Sclera nonicteric] : sclera nonicteric [de-identified] : s/p bilateral mastectomies with ALESSANDRO flap reconstruction. She has bilateral lower breast skin islands.

## 2022-12-13 NOTE — CONSULT LETTER
[Dear  ___] : Dear  [unfilled], [Courtesy Letter:] : I had the pleasure of seeing your patient, [unfilled], in my office today. [Please see my note below.] : Please see my note below. [Sincerely,] : Sincerely, [DrJam  ___] : Dr. MURPHY [FreeTextEntry3] : Susan M. Palleschi, MD, FACS\par Division of Breast Surgery\par Director, Breast Surgery\par [facility]\par [address] [place] [address]\par [address]\[place] [address]\par (Phone) [phone]\par (Fax) [phone]  [DrJam ___] : Dr. MURPHY

## 2022-12-13 NOTE — REVIEW OF SYSTEMS
[Arthralgias] : arthralgias [Negative] : Endocrine [de-identified] : psoriasis [de-identified] : hx of breast cancer

## 2022-12-13 NOTE — HISTORY OF PRESENT ILLNESS
[FreeTextEntry1] : Patient is a 51 year old female here today for a follow up visit. \par She has a history of left invasive ductal carcinoma and right breast DCIS. \par She has a family history of breast cancer in her maternal aunt, diagnosed at an unknown age. \par 9/1/2021 [employer] genetic testing: negative. \par 9/27/2021 s/p left 3:00 GASTON  localization lumpectomy, left axillary sentinel lymph node biopsy, excision of left axillary accessory breast tissue, bilateral oncoplastic reduction. \par Pathology revealed:\par 1. Left axillary sentinel lymph node x 1 negative \par 2. Left 3:00 lumpectomy: Invasive ductal carcinoma, grade 2, 0.9 cm, no definitive in situ carcinoma or lymphovascular invasion identified. Negative margins, biopsy site changes. \par 3. Left axillary accessory breast tissue: benign\par 4. Right reduction mammoplasty: Ductal carcinoma in situ (DCIS), grade 2. The largest focus of DCIS measures 0.7 cm in greatest dimension. DCIS is present in 3 of 7 slides. Margin status indeterminate, benign skin. \par 5. Left reduction mammoplasty breast tissue and skin: benign breast tissue\par Left breast Stage IA, ER+/CT+/[id number], right breast Stage 0\par Med Onc: Dr. Cannon; Onc Dx recurrence score 14 (<1% absolute chemo benefit). Currently on Tamoxifen. She saw her 7/2022. Patient reports she has been experiencing hot flashes, for which she has been taking Provitalize supplements. \par 12/20/2021 s/p bilateral nipple areolar sparing mastectomies, right axillary sentinel lymph node biopsy (2) and ALESSANDRO flap reconstruction. Pathology benign. \par Gyn Onc: Dr. Xiomara Carrion; completed a total hysterectomy with BSO on 3/18/2022. She states the pathology was benign.\par Plastic surgeon: Dr. Zavala. She last saw him in 9/2022. She has an upcoming appointment with the tattoo specialist at his office for bilateral NAC and umbilicus tattooing. She did not have her umbilicus reconstructed as the patient had been experiencing abdominal pain. \par She came alone. \par She has been wearing sports bras, which she finds comfortable most of the time. She notes some pinching from her bras at times.  She denies any breast masses on self exam.\par Pt denies any palpable masses or other breast concerns on self examinations.

## 2022-12-15 ENCOUNTER — APPOINTMENT (OUTPATIENT)
Dept: PAIN MANAGEMENT | Facility: CLINIC | Age: 52
End: 2022-12-15

## 2022-12-16 ENCOUNTER — APPOINTMENT (OUTPATIENT)
Dept: ORTHOPEDIC SURGERY | Facility: CLINIC | Age: 52
End: 2022-12-16

## 2023-04-07 NOTE — H&P PST ADULT - PATIENT ON (OXYGEN DELIVERY METHOD)
Low Dose Naltrexone Counseling- I discussed with the patient the potential risks and side effects of low dose naltrexone including but not limited to: more vivid dreams, headaches, nausea, vomiting, abdominal pain, fatigue, dizziness, and anxiety. room air

## 2023-05-04 NOTE — H&P PST ADULT - ALCOHOL USE HISTORY SINGLE SELECT
Recommend saline spray followed by suctioning to reduce nasal drainage/congestion. 2-3 x daily.     Can continue to use tylenol and ibuprofen for fever control    Push fluids    Encourage rest.     Humidifier in the room    Can use children's mucinex cough/cold medication.     PLAN:  Symptoms suggestive of viral upper respiratory infection at this time.     Expect cold symptoms to last 7-10 days before improving. Fevers can be present for the first 3-4 days, but should subside after that.  Sore throats typically improve after 5-7 days. Cough may hang on longer, even for 2-3 weeks (sometimes even longer) but should always be improving after you've had it for 2 weeks. Follow up with your primary care provider in 5-7 days if your illness is not following this course.    
yes...

## 2023-06-13 ENCOUNTER — APPOINTMENT (OUTPATIENT)
Dept: PLASTIC SURGERY | Facility: CLINIC | Age: 53
End: 2023-06-13
Payer: COMMERCIAL

## 2023-07-07 ENCOUNTER — APPOINTMENT (OUTPATIENT)
Dept: PLASTIC SURGERY | Facility: CLINIC | Age: 53
End: 2023-07-07
Payer: COMMERCIAL

## 2023-07-07 VITALS
HEIGHT: 64 IN | SYSTOLIC BLOOD PRESSURE: 125 MMHG | HEART RATE: 84 BPM | OXYGEN SATURATION: 97 % | WEIGHT: 191 LBS | BODY MASS INDEX: 32.61 KG/M2 | DIASTOLIC BLOOD PRESSURE: 87 MMHG | TEMPERATURE: 98.3 F

## 2023-07-07 DIAGNOSIS — C50.812 MALIGNANT NEOPLASM OF OVERLAPPING SITES OF LEFT FEMALE BREAST: ICD-10-CM

## 2023-07-07 DIAGNOSIS — Z17.0 MALIGNANT NEOPLASM OF OVERLAPPING SITES OF LEFT FEMALE BREAST: ICD-10-CM

## 2023-07-07 DIAGNOSIS — D05.11 INTRADUCTAL CARCINOMA IN SITU OF RIGHT BREAST: ICD-10-CM

## 2023-07-07 PROCEDURE — 99213 OFFICE O/P EST LOW 20 MIN: CPT

## 2023-07-07 NOTE — ASSESSMENT
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 Follow up office visit  1/2024\par 2. Advised monthly self breast examinations and advised her to contact me if she has any concerns.

## 2023-07-07 NOTE — CONSULT LETTER
[FreeTextEntry3] : Rachelle Subramanian, RPA-C\par Breast Surgery\par [address]\par [address]\[place] [address]\par (Phone) [phone]\par (Fax) [phone]

## 2023-07-07 NOTE — HISTORY OF PRESENT ILLNESS
[FreeTextEntry1] : Patient is a 52 year old female here today for a follow up visit. \par She has a history of left invasive ductal carcinoma and right breast DCIS. \par She has a family history of breast cancer in her maternal aunt, diagnosed at an unknown age. \par 9/1/2021 [employer] genetic testing: negative. \par 9/27/2021 s/p left 3:00 GASTON  localization lumpectomy, left axillary sentinel lymph node biopsy, excision of left axillary accessory breast tissue, bilateral oncoplastic reduction. \par Pathology revealed:\par 1. Left axillary sentinel lymph node x 1 negative \par 2. Left 3:00 lumpectomy: Invasive ductal carcinoma, grade 2, 0.9 cm, no definitive in situ carcinoma or lymphovascular invasion identified. Negative margins, biopsy site changes. \par 3. Left axillary accessory breast tissue: benign\par 4. Right reduction mammoplasty: Ductal carcinoma in situ (DCIS), grade 2. The largest focus of DCIS measures 0.7 cm in greatest dimension. DCIS is present in 3 of 7 slides. Margin status indeterminate, benign skin. \par 5. Left reduction mammoplasty breast tissue and skin: benign breast tissue\par Left breast Stage IA, ER+/ID+/[id number], right breast Stage 0\par Med Onc: Dr. Cannon; Onc Dx recurrence score 14 (<1% absolute chemo benefit). Currently on Tamoxifen. She saw her 3/2023\par 12/20/2021 s/p bilateral nipple areolar sparing mastectomies, right axillary sentinel lymph node biopsy (2) and ALESSANDRO flap reconstruction. Pathology benign. \par 5/2022 s/p skin island removal and scar revision surgery\par Gyn Onc: Dr. Xiomara Carrion; completed a total hysterectomy with BSO on 3/18/2022. She states the pathology was benign.\par Plastic surgeon: Dr. Zavala. She had areolar tattooing x 2 completed 4/2023. The belly button tattoo did not come out well so she will need to have it removed with a laser. She is scheduled in September for further scar revision and fat grafting. She saw him last month.\par She notes intermittent bilateral upper chest wall sharp pain.

## 2023-07-11 ENCOUNTER — RX RENEWAL (OUTPATIENT)
Age: 53
End: 2023-07-11

## 2023-07-20 ENCOUNTER — NON-APPOINTMENT (OUTPATIENT)
Age: 53
End: 2023-07-20

## 2023-08-02 NOTE — ASU PREOP CHECKLIST - NS PREOP CHK HIBICLENS NA
Requested medication(s) are due for refill today: Yes  Patient has already received a courtesy refill: No  Other reason request has been forwarded to provider:
N/A

## 2023-11-08 ENCOUNTER — NON-APPOINTMENT (OUTPATIENT)
Age: 53
End: 2023-11-08

## 2023-11-14 ENCOUNTER — NON-APPOINTMENT (OUTPATIENT)
Age: 53
End: 2023-11-14

## 2023-11-28 NOTE — H&P PST ADULT - NS ABD PE RECTAL EXAM
Psychosocial Assessment    Current Level of Psychosocial Functioning     Independent X  Dependent    Minimal Assist     Comments:      Psychosocial High Risk Factors (check all that apply)    Unable to obtain meds   Chronic illness/pain    Substance abuse X  Lack of Family Support   Financial stress   Isolation   Inadequate Community Resources  Suicide attempt(s)  Not taking medications   Victim of crime   Developmental Delay  Unable to manage personal needs    Age 72 or older   Homeless  No transportation   Readmission within 30 days  Unemployment  Traumatic Event    Family/Supports identified:   Father  Boyfriend    Sexual Orientation:    Heterosexual    Patient Strengths:  Stable Housing  Active with [facility]    Patient Barriers:   Family Stressors    Safety plan:  Completed    CMHC/MH history:  Bipolar 1 disorder, depressed    Plan of Care:  medication management, group/individual therapies, family meetings, psycho -education, treatment team meetings to assist with stabilization    Initial Discharge Plan:    Return home with boyfriend/ meet with [facility] to maintain sobriety, Rebecca    Clinical Summary:    Patient is a 35year old female recently admitted to the [facility]. Patient reports that she is active with the [facility]. Pt reports she has her own apartment and lives with her boyfriend. The pt reports serious family stress her parents are  and she has one younger brother with an addiction problem who lives with the mother. Pt reports that her Father is supportive. Patient would like a referral to [facility] to help maintain sobriety. Patient admits to passive suicidal thoughts and reports no plan and no intent to act. Patient reports no prior suicide attempts.     Electronically signed by KIARA Silva on 11/28/2023 at 12:12 PM patient refused

## 2023-12-20 ENCOUNTER — NON-APPOINTMENT (OUTPATIENT)
Age: 53
End: 2023-12-20

## 2024-01-09 ENCOUNTER — NON-APPOINTMENT (OUTPATIENT)
Age: 54
End: 2024-01-09

## 2024-01-24 ENCOUNTER — RX RENEWAL (OUTPATIENT)
Age: 54
End: 2024-01-24

## 2024-01-24 RX ORDER — DICLOFENAC SODIUM 75 MG/1
75 TABLET, DELAYED RELEASE ORAL
Qty: 60 | Refills: 3 | Status: ACTIVE | COMMUNITY
Start: 2022-11-18 | End: 1900-01-01

## 2024-03-06 NOTE — ASSESSMENT
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  1. Follow up office visit 9/2024 2. Advised monthly self breast examinations and advised her to contact me if she has any concerns.

## 2024-03-06 NOTE — PHYSICAL EXAM
[Normocephalic] : normocephalic [Atraumatic] : atraumatic [EOMI] : extra ocular movement intact [Sclera nonicteric] : sclera nonicteric [Supple] : supple [No Supraclavicular Adenopathy] : no supraclavicular adenopathy [No Cervical Adenopathy] : no cervical adenopathy [No Thyromegaly] : no thyromegaly [Examined in the supine and seated position] : examined in the supine and seated position [No dominant masses] : no dominant masses in right breast  [No dominant masses] : no dominant masses left breast [No Nipple Retraction] : no left nipple retraction [No Nipple Discharge] : no left nipple discharge [No Axillary Lymphadenopathy] : no left axillary lymphadenopathy [No Edema] : no edema [No Rashes] : no rashes [No Ulceration] : no ulceration [de-identified] : s/p bilateral mastectomies with ALESSANDRO flap reconstruction.

## 2024-03-06 NOTE — HISTORY OF PRESENT ILLNESS
[FreeTextEntry1] : Patient is a 53 year old female here today for a follow up visit. She has a history of left invasive ductal carcinoma and right breast DCIS. She has a family history of breast cancer in her maternal aunt, diagnosed at an unknown age. 9/1/2021 [employer] genetic testing: negative. 9/27/2021 s/p left 3:00 GASTON  localization lumpectomy, left axillary sentinel lymph node biopsy, excision of left axillary accessory breast tissue, bilateral oncoplastic reduction. Pathology revealed: 1. Left axillary sentinel lymph node x 1 negative 2. Left 3:00 lumpectomy: Invasive ductal carcinoma, grade 2, 0.9 cm, no definitive in situ carcinoma or lymphovascular invasion identified. Negative margins, biopsy site changes. 3. Left axillary accessory breast tissue: benign 4. Right reduction mammoplasty: Ductal carcinoma in situ (DCIS), grade 2. The largest focus of DCIS measures 0.7 cm in greatest dimension. DCIS is present in 3 of 7 slides. Margin status indeterminate, benign skin. 5. Left reduction mammoplasty breast tissue and skin: benign breast tissue Left breast Stage IA, ER+/CO+/[id number], right breast Stage 0 Oncotype Dx recurrence score 14 (<1% absolute chemo benefit). Med Onc: Dr. Cannon; Currently on Tamoxifen. She saw her ** 12/20/2021 s/p bilateral nipple areolar sparing mastectomies, right axillary sentinel lymph node biopsy (2) and ALESSANDRO flap reconstruction. Pathology benign. 5/2022 s/p skin island removal and scar revision surgery Gyn Onc: Dr. Xiomara Carrion; completed a total hysterectomy with BSO on 3/18/2022. She states the pathology was benign. Plastic surgeon: Dr. Zavala. She had areolar tattooing x 2 completed 4/2023. The belly button tattoo did not come out well so she will need to have it removed with a laser. She had further scar revision and fat grafting**

## 2024-03-06 NOTE — CONSULT LETTER
[Dear  ___] : Dear  [unfilled], [Courtesy Letter:] : I had the pleasure of seeing your patient, [unfilled], in my office today. [Please see my note below.] : Please see my note below. [Sincerely,] : Sincerely, [FreeTextEntry3] : Susan M. Palleschi, MD, FACS Division of Breast Surgery Director, Breast Surgery [address] [address] (Phone) [phone] (Fax) [phone]  [DrJam  ___] : Dr. MURPHY [DrJam ___] : Dr. MURPHY

## 2024-03-08 ENCOUNTER — APPOINTMENT (OUTPATIENT)
Dept: PLASTIC SURGERY | Facility: CLINIC | Age: 54
End: 2024-03-08

## 2024-09-05 ENCOUNTER — NON-APPOINTMENT (OUTPATIENT)
Age: 54
End: 2024-09-05

## 2024-11-08 ENCOUNTER — APPOINTMENT (OUTPATIENT)
Dept: GASTROENTEROLOGY | Facility: CLINIC | Age: 54
End: 2024-11-08
Payer: MEDICAID

## 2024-11-08 VITALS
WEIGHT: 169 LBS | OXYGEN SATURATION: 95 % | SYSTOLIC BLOOD PRESSURE: 132 MMHG | BODY MASS INDEX: 28.85 KG/M2 | HEIGHT: 64 IN | HEART RATE: 82 BPM | DIASTOLIC BLOOD PRESSURE: 68 MMHG

## 2024-11-08 DIAGNOSIS — K21.9 GASTRO-ESOPHAGEAL REFLUX DISEASE W/OUT ESOPHAGITIS: ICD-10-CM

## 2024-11-08 DIAGNOSIS — K59.09 OTHER CONSTIPATION: ICD-10-CM

## 2024-11-08 PROCEDURE — 99204 OFFICE O/P NEW MOD 45 MIN: CPT

## 2024-11-08 RX ORDER — SENNOSIDES 8.6 MG TABLETS 8.6 MG/1
8.6 TABLET ORAL
Qty: 60 | Refills: 5 | Status: ACTIVE | COMMUNITY
Start: 2024-11-08 | End: 1900-01-01

## 2024-11-08 RX ORDER — VONOPRAZAN FUMARATE 26.72 MG/1
20 TABLET ORAL
Qty: 30 | Refills: 3 | Status: ACTIVE | COMMUNITY
Start: 2024-11-08 | End: 1900-01-01

## 2024-11-15 ENCOUNTER — APPOINTMENT (OUTPATIENT)
Dept: GASTROENTEROLOGY | Facility: CLINIC | Age: 54
End: 2024-11-15
Payer: MEDICAID

## 2024-11-15 ENCOUNTER — LABORATORY RESULT (OUTPATIENT)
Age: 54
End: 2024-11-15

## 2024-11-15 PROCEDURE — 43239 EGD BIOPSY SINGLE/MULTIPLE: CPT

## 2025-07-10 NOTE — PATIENT PROFILE ADULT - STATED REASON FOR ADMISSION
Copied from CRM #[id number]. Topic: General Inquiry - Patient Advice  >> Jul 10, 2025 10:03 AM Lashonda wrote:     Consult/Advisory     Name Of Caller:Katja Cardoso         Contact Preference:[phone] (home)      Nature of call:Patient is calling to speak to Stacey and see if its to late to keep appt for 07/15. Requesting a call back  >> Jul 10, 2025 10:16 AM ASHWINI Cevallos wrote:   Robotic assisted total lap. hysterectomy, BSO, poss cystoscopy, sentinel lymph note mapping, node biopsies.

## (undated) DEVICE — PREP CHLORAPREP ORANGE 2PCT 26ML

## (undated) DEVICE — LIGASURE ATLAS 10MM 20CM

## (undated) DEVICE — TROCAR SURGIQUEST AIRSEAL 8MMX100MM

## (undated) DEVICE — XI DRAPE COLUMN

## (undated) DEVICE — BLANKET WARMER UPPER ADULT

## (undated) DEVICE — ELCTR GROUNDING PAD ADULT COVIDIEN

## (undated) DEVICE — NEEDLE COUNTER FOAM AND ADHESIVE 40-70

## (undated) DEVICE — SUT VICRYL 0 27" UR-6

## (undated) DEVICE — UTERINE MANIPULATOR CONMED VCARE MED 34MM

## (undated) DEVICE — SUT MONOCRYL 4-0 27" PS-2 UNDYED

## (undated) DEVICE — TUBE TRI-LUMEN FILT USE W AIRSEAL

## (undated) DEVICE — XI OBTURATOR OPTICAL BLADELESS 8MM

## (undated) DEVICE — SUT VLOC 180 0 9" GS-21 GREEN

## (undated) DEVICE — XI SEAL UNIV 5- 8 MM

## (undated) DEVICE — SYR CONTROL LUER LOK 10CC

## (undated) DEVICE — WRAP COMPRESSION CALF MED

## (undated) DEVICE — POSITIONER FOAM EGG CRATE ULNAR (2PCS)

## (undated) DEVICE — DRSG OPSITE POSTOP 2.5"X2"

## (undated) DEVICE — BLADE SURGICAL #11 CARBON

## (undated) DEVICE — XI DRAPE ARM

## (undated) DEVICE — SOL INJ NS 0.9% 100ML

## (undated) DEVICE — ELCTR CORD FOOTSWITCH 1PLR LAPSCP 10FT

## (undated) DEVICE — NDL SPINAL 22G X 3.5"

## (undated) DEVICE — DRAPE ROBOTIC

## (undated) DEVICE — PACK ROBOTIC

## (undated) DEVICE — POSITIONER PINK PAD PIGAZZI SYSTEM

## (undated) DEVICE — DRSG KERLIX ROLL 4.5"

## (undated) DEVICE — XI SEALER DA VINCI VESSEL

## (undated) DEVICE — LIGASURE BLUNT TIP NANO CTD 37CM

## (undated) DEVICE — SOL IRR POUR NS 0.9% 1000ML

## (undated) DEVICE — SOL IRR POUR H2O 1000ML

## (undated) DEVICE — COVER TIP INTUITIVE W INSERTION TOOL

## (undated) DEVICE — UTERINE MANIPULATOR CONMED VCARE LG 37MM

## (undated) DEVICE — UTERINE MANIPULATOR CONMED VCARE SM 32MM

## (undated) DEVICE — GLV 7.5 PROTEXIS

## (undated) DEVICE — SUT VICRYL 2-0 27" CT-2 UNDYED